# Patient Record
Sex: FEMALE | Race: WHITE | NOT HISPANIC OR LATINO | Employment: OTHER
[De-identification: names, ages, dates, MRNs, and addresses within clinical notes are randomized per-mention and may not be internally consistent; named-entity substitution may affect disease eponyms.]

---

## 2019-11-03 ENCOUNTER — HEALTH MAINTENANCE LETTER (OUTPATIENT)
Age: 64
End: 2019-11-03

## 2020-02-10 ENCOUNTER — HEALTH MAINTENANCE LETTER (OUTPATIENT)
Age: 65
End: 2020-02-10

## 2020-11-16 ENCOUNTER — HEALTH MAINTENANCE LETTER (OUTPATIENT)
Age: 65
End: 2020-11-16

## 2021-04-04 ENCOUNTER — HEALTH MAINTENANCE LETTER (OUTPATIENT)
Age: 66
End: 2021-04-04

## 2021-09-18 ENCOUNTER — HEALTH MAINTENANCE LETTER (OUTPATIENT)
Age: 66
End: 2021-09-18

## 2021-11-13 ENCOUNTER — HEALTH MAINTENANCE LETTER (OUTPATIENT)
Age: 66
End: 2021-11-13

## 2022-01-08 ENCOUNTER — HEALTH MAINTENANCE LETTER (OUTPATIENT)
Age: 67
End: 2022-01-08

## 2022-04-30 ENCOUNTER — HEALTH MAINTENANCE LETTER (OUTPATIENT)
Age: 67
End: 2022-04-30

## 2022-10-17 LAB
ALT SERPL-CCNC: 25 IU/L (ref 8–45)
AST SERPL-CCNC: 18 IU/L (ref 2–40)
CREATININE (EXTERNAL): 0.76 MG/DL (ref 0.57–1.11)
GFR ESTIMATED (EXTERNAL): 85 ML/MIN/1.73M2
GLUCOSE (EXTERNAL): 109 MG/DL (ref 65–100)
POTASSIUM (EXTERNAL): 4 MMOL/L (ref 3.5–5)

## 2022-10-27 ENCOUNTER — TRANSFERRED RECORDS (OUTPATIENT)
Dept: HEALTH INFORMATION MANAGEMENT | Facility: CLINIC | Age: 67
End: 2022-10-27

## 2022-11-03 ENCOUNTER — TRANSFERRED RECORDS (OUTPATIENT)
Dept: HEALTH INFORMATION MANAGEMENT | Facility: CLINIC | Age: 67
End: 2022-11-03

## 2022-11-03 LAB
CREATININE (EXTERNAL): 0.75 MG/DL (ref 0.57–1.11)
GFR ESTIMATED (EXTERNAL): 87 ML/MIN/1.73M2
GLUCOSE (EXTERNAL): 130 MG/DL (ref 65–100)
HBA1C MFR BLD: 6.2 %
POTASSIUM (EXTERNAL): 4.1 MMOL/L (ref 3.5–5)

## 2022-11-09 RX ORDER — LORAZEPAM 0.5 MG/1
0.5 TABLET ORAL EVERY 6 HOURS PRN
Status: ON HOLD | COMMUNITY
End: 2022-11-15

## 2022-11-09 RX ORDER — LEVOTHYROXINE SODIUM 88 UG/1
88 TABLET ORAL DAILY
COMMUNITY

## 2022-11-09 RX ORDER — METOPROLOL SUCCINATE 50 MG/1
50 TABLET, EXTENDED RELEASE ORAL AT BEDTIME
COMMUNITY

## 2022-11-09 RX ORDER — HYDROCODONE BITARTRATE AND ACETAMINOPHEN 5; 325 MG/1; MG/1
.5-1 TABLET ORAL EVERY 4 HOURS PRN
Status: ON HOLD | COMMUNITY
End: 2022-11-15

## 2022-11-09 RX ORDER — LATANOPROST 50 UG/ML
1 SOLUTION/ DROPS OPHTHALMIC AT BEDTIME
COMMUNITY

## 2022-11-11 ENCOUNTER — ANESTHESIA (OUTPATIENT)
Dept: SURGERY | Facility: CLINIC | Age: 67
DRG: 472 | End: 2022-11-11
Payer: MEDICARE

## 2022-11-11 ENCOUNTER — ANESTHESIA EVENT (OUTPATIENT)
Dept: SURGERY | Facility: CLINIC | Age: 67
DRG: 472 | End: 2022-11-11
Payer: MEDICARE

## 2022-11-11 ENCOUNTER — APPOINTMENT (OUTPATIENT)
Dept: RADIOLOGY | Facility: CLINIC | Age: 67
DRG: 472 | End: 2022-11-11
Attending: ORTHOPAEDIC SURGERY
Payer: MEDICARE

## 2022-11-11 ENCOUNTER — APPOINTMENT (OUTPATIENT)
Dept: CT IMAGING | Facility: CLINIC | Age: 67
DRG: 472 | End: 2022-11-11
Attending: PHYSICIAN ASSISTANT
Payer: MEDICARE

## 2022-11-11 ENCOUNTER — HOSPITAL ENCOUNTER (INPATIENT)
Facility: CLINIC | Age: 67
LOS: 4 days | Discharge: NURSING, REHAB OR LONG-TERM CARE | DRG: 472 | End: 2022-11-15
Attending: ORTHOPAEDIC SURGERY | Admitting: ORTHOPAEDIC SURGERY
Payer: MEDICARE

## 2022-11-11 DIAGNOSIS — L29.9 ITCHING: Primary | ICD-10-CM

## 2022-11-11 DIAGNOSIS — F41.9 ANXIETY: ICD-10-CM

## 2022-11-11 DIAGNOSIS — F41.0 PANIC ATTACK: ICD-10-CM

## 2022-11-11 PROBLEM — G95.9 CERVICAL MYELOPATHY (H): Status: ACTIVE | Noted: 2022-11-11

## 2022-11-11 PROBLEM — I48.92 ATRIAL FLUTTER (H): Status: ACTIVE | Noted: 2022-11-11

## 2022-11-11 LAB
GLUCOSE BLDC GLUCOMTR-MCNC: 118 MG/DL (ref 70–99)
GLUCOSE BLDC GLUCOMTR-MCNC: 149 MG/DL (ref 70–99)
GLUCOSE BLDC GLUCOMTR-MCNC: 167 MG/DL (ref 70–99)

## 2022-11-11 PROCEDURE — 258N000003 HC RX IP 258 OP 636: Performed by: NURSE ANESTHETIST, CERTIFIED REGISTERED

## 2022-11-11 PROCEDURE — 258N000003 HC RX IP 258 OP 636: Performed by: ORTHOPAEDIC SURGERY

## 2022-11-11 PROCEDURE — 360N000085 HC SURGERY LEVEL 5 W/ FLUORO, PER MIN: Performed by: ORTHOPAEDIC SURGERY

## 2022-11-11 PROCEDURE — 4A1034Z MONITORING OF CENTRAL NERVOUS ELECTRICAL ACTIVITY, PERCUTANEOUS APPROACH: ICD-10-PCS | Performed by: ORTHOPAEDIC SURGERY

## 2022-11-11 PROCEDURE — 258N000003 HC RX IP 258 OP 636: Performed by: ANESTHESIOLOGY

## 2022-11-11 PROCEDURE — 250N000013 HC RX MED GY IP 250 OP 250 PS 637: Performed by: ORTHOPAEDIC SURGERY

## 2022-11-11 PROCEDURE — 999N000141 HC STATISTIC PRE-PROCEDURE NURSING ASSESSMENT: Performed by: ORTHOPAEDIC SURGERY

## 2022-11-11 PROCEDURE — 00NW0ZZ RELEASE CERVICAL SPINAL CORD, OPEN APPROACH: ICD-10-PCS | Performed by: ORTHOPAEDIC SURGERY

## 2022-11-11 PROCEDURE — 0HQ0XZZ REPAIR SCALP SKIN, EXTERNAL APPROACH: ICD-10-PCS | Performed by: ORTHOPAEDIC SURGERY

## 2022-11-11 PROCEDURE — 8E0WXBF COMPUTER ASSISTED PROCEDURE OF TRUNK REGION, WITH FLUOROSCOPY: ICD-10-PCS | Performed by: ORTHOPAEDIC SURGERY

## 2022-11-11 PROCEDURE — 250N000009 HC RX 250: Performed by: ORTHOPAEDIC SURGERY

## 2022-11-11 PROCEDURE — 250N000009 HC RX 250: Performed by: NURSE ANESTHETIST, CERTIFIED REGISTERED

## 2022-11-11 PROCEDURE — 250N000011 HC RX IP 250 OP 636: Performed by: NURSE ANESTHETIST, CERTIFIED REGISTERED

## 2022-11-11 PROCEDURE — 250N000011 HC RX IP 250 OP 636: Performed by: PHYSICIAN ASSISTANT

## 2022-11-11 PROCEDURE — 710N000010 HC RECOVERY PHASE 1, LEVEL 2, PER MIN: Performed by: ORTHOPAEDIC SURGERY

## 2022-11-11 PROCEDURE — 4A1134G MONITORING OF PERIPHERAL NERVOUS ELECTRICAL ACTIVITY, INTRAOPERATIVE, PERCUTANEOUS APPROACH: ICD-10-PCS | Performed by: ORTHOPAEDIC SURGERY

## 2022-11-11 PROCEDURE — 250N000011 HC RX IP 250 OP 636: Performed by: ANESTHESIOLOGY

## 2022-11-11 PROCEDURE — 370N000017 HC ANESTHESIA TECHNICAL FEE, PER MIN: Performed by: ORTHOPAEDIC SURGERY

## 2022-11-11 PROCEDURE — C1762 CONN TISS, HUMAN(INC FASCIA): HCPCS | Performed by: ORTHOPAEDIC SURGERY

## 2022-11-11 PROCEDURE — 01N10ZZ RELEASE CERVICAL NERVE, OPEN APPROACH: ICD-10-PCS | Performed by: ORTHOPAEDIC SURGERY

## 2022-11-11 PROCEDURE — 0RG4071 FUSION OF CERVICOTHORACIC VERTEBRAL JOINT WITH AUTOLOGOUS TISSUE SUBSTITUTE, POSTERIOR APPROACH, POSTERIOR COLUMN, OPEN APPROACH: ICD-10-PCS | Performed by: ORTHOPAEDIC SURGERY

## 2022-11-11 PROCEDURE — C1713 ANCHOR/SCREW BN/BN,TIS/BN: HCPCS | Performed by: ORTHOPAEDIC SURGERY

## 2022-11-11 PROCEDURE — 272N000001 HC OR GENERAL SUPPLY STERILE: Performed by: ORTHOPAEDIC SURGERY

## 2022-11-11 PROCEDURE — 250N000011 HC RX IP 250 OP 636: Performed by: ORTHOPAEDIC SURGERY

## 2022-11-11 PROCEDURE — G1010 CDSM STANSON: HCPCS

## 2022-11-11 PROCEDURE — 250N000005 HC OR RX SURGIFLO HEMOSTATIC MATRIX 10ML 199102S OPNP: Performed by: ORTHOPAEDIC SURGERY

## 2022-11-11 PROCEDURE — 120N000001 HC R&B MED SURG/OB

## 2022-11-11 PROCEDURE — 250N000011 HC RX IP 250 OP 636: Performed by: HOSPITALIST

## 2022-11-11 PROCEDURE — 999N000182 XR SURGERY CARM FLUORO GREATER THAN 5 MIN: Mod: TC

## 2022-11-11 PROCEDURE — 250N000025 HC SEVOFLURANE, PER MIN: Performed by: ORTHOPAEDIC SURGERY

## 2022-11-11 DEVICE — SCREW BN 22MM 4MM MA NS INFNT SPNE LF: Type: IMPLANTABLE DEVICE | Site: SPINE CERVICAL | Status: FUNCTIONAL

## 2022-11-11 DEVICE — ROD SPNL 30MM 3.5MM PCUT: Type: IMPLANTABLE DEVICE | Site: SPINE CERVICAL | Status: FUNCTIONAL

## 2022-11-11 DEVICE — IMP SCREW INFINITY SPINE MULTIAXIAL 16MMX3.5MM 3603516: Type: IMPLANTABLE DEVICE | Site: SPINE CERVICAL | Status: FUNCTIONAL

## 2022-11-11 DEVICE — IMP SET SCREW MEDTRONIC INFINITY 3600215: Type: IMPLANTABLE DEVICE | Site: SPINE CERVICAL | Status: FUNCTIONAL

## 2022-11-11 DEVICE — SCREW BN 20MM 3.5MM MA NS INFNT SPNE OC UPR THOR LF: Type: IMPLANTABLE DEVICE | Site: SPINE CERVICAL | Status: FUNCTIONAL

## 2022-11-11 DEVICE — KIT BNGF 6CC DMNR CORT FBR ACCELERATE GRFTN CNN T50206: Type: IMPLANTABLE DEVICE | Site: SPINE CERVICAL | Status: FUNCTIONAL

## 2022-11-11 RX ORDER — GABAPENTIN 100 MG/1
100 CAPSULE ORAL
Status: DISCONTINUED | OUTPATIENT
Start: 2022-11-11 | End: 2022-11-11 | Stop reason: CLARIF

## 2022-11-11 RX ORDER — CLOTRIMAZOLE AND BETAMETHASONE DIPROPIONATE 10; .64 MG/G; MG/G
CREAM TOPICAL 2 TIMES DAILY PRN
COMMUNITY

## 2022-11-11 RX ORDER — LISINOPRIL 40 MG/1
40 TABLET ORAL AT BEDTIME
COMMUNITY

## 2022-11-11 RX ORDER — CEFAZOLIN SODIUM/WATER 2 G/20 ML
2 SYRINGE (ML) INTRAVENOUS SEE ADMIN INSTRUCTIONS
Status: DISCONTINUED | OUTPATIENT
Start: 2022-11-11 | End: 2022-11-11 | Stop reason: HOSPADM

## 2022-11-11 RX ORDER — CLONIDINE HYDROCHLORIDE 0.1 MG/1
0.1 TABLET ORAL DAILY PRN
COMMUNITY

## 2022-11-11 RX ORDER — POLYETHYLENE GLYCOL 3350 17 G/17G
17 POWDER, FOR SOLUTION ORAL DAILY
Status: DISCONTINUED | OUTPATIENT
Start: 2022-11-12 | End: 2022-11-15 | Stop reason: HOSPADM

## 2022-11-11 RX ORDER — LORAZEPAM 0.5 MG/1
0.5 TABLET ORAL EVERY 6 HOURS PRN
Status: DISCONTINUED | OUTPATIENT
Start: 2022-11-11 | End: 2022-11-15 | Stop reason: HOSPADM

## 2022-11-11 RX ORDER — SODIUM CHLORIDE, SODIUM LACTATE, POTASSIUM CHLORIDE, CALCIUM CHLORIDE 600; 310; 30; 20 MG/100ML; MG/100ML; MG/100ML; MG/100ML
INJECTION, SOLUTION INTRAVENOUS CONTINUOUS
Status: DISCONTINUED | OUTPATIENT
Start: 2022-11-11 | End: 2022-11-11

## 2022-11-11 RX ORDER — CLOTRIMAZOLE AND BETAMETHASONE DIPROPIONATE 10; .64 MG/G; MG/G
CREAM TOPICAL 2 TIMES DAILY PRN
Status: DISCONTINUED | OUTPATIENT
Start: 2022-11-11 | End: 2022-11-15 | Stop reason: HOSPADM

## 2022-11-11 RX ORDER — BUPIVACAINE HYDROCHLORIDE AND EPINEPHRINE 2.5; 5 MG/ML; UG/ML
2 INJECTION, SOLUTION EPIDURAL; INFILTRATION; INTRACAUDAL; PERINEURAL ONCE
Status: DISCONTINUED | OUTPATIENT
Start: 2022-11-11 | End: 2022-11-11 | Stop reason: HOSPADM

## 2022-11-11 RX ORDER — ACETAMINOPHEN 325 MG/1
650 TABLET ORAL EVERY 4 HOURS PRN
Status: DISCONTINUED | OUTPATIENT
Start: 2022-11-14 | End: 2022-11-15 | Stop reason: HOSPADM

## 2022-11-11 RX ORDER — AMOXICILLIN 250 MG
1 CAPSULE ORAL 2 TIMES DAILY
Status: DISCONTINUED | OUTPATIENT
Start: 2022-11-11 | End: 2022-11-15 | Stop reason: HOSPADM

## 2022-11-11 RX ORDER — PROPOFOL 10 MG/ML
INJECTION, EMULSION INTRAVENOUS PRN
Status: DISCONTINUED | OUTPATIENT
Start: 2022-11-11 | End: 2022-11-11

## 2022-11-11 RX ORDER — PROPOFOL 10 MG/ML
INJECTION, EMULSION INTRAVENOUS CONTINUOUS PRN
Status: DISCONTINUED | OUTPATIENT
Start: 2022-11-11 | End: 2022-11-11

## 2022-11-11 RX ORDER — GLYCOPYRROLATE 0.2 MG/ML
INJECTION, SOLUTION INTRAMUSCULAR; INTRAVENOUS PRN
Status: DISCONTINUED | OUTPATIENT
Start: 2022-11-11 | End: 2022-11-11

## 2022-11-11 RX ORDER — ONDANSETRON 2 MG/ML
4 INJECTION INTRAMUSCULAR; INTRAVENOUS EVERY 6 HOURS PRN
Status: DISCONTINUED | OUTPATIENT
Start: 2022-11-11 | End: 2022-11-15 | Stop reason: HOSPADM

## 2022-11-11 RX ORDER — OXYCODONE HYDROCHLORIDE 5 MG/1
5 TABLET ORAL EVERY 4 HOURS PRN
Status: DISCONTINUED | OUTPATIENT
Start: 2022-11-11 | End: 2022-11-11 | Stop reason: HOSPADM

## 2022-11-11 RX ORDER — LEVOTHYROXINE SODIUM 88 UG/1
88 TABLET ORAL DAILY
Status: DISCONTINUED | OUTPATIENT
Start: 2022-11-12 | End: 2022-11-15 | Stop reason: HOSPADM

## 2022-11-11 RX ORDER — MAGNESIUM SULFATE 4 G/50ML
4 INJECTION INTRAVENOUS ONCE
Status: COMPLETED | OUTPATIENT
Start: 2022-11-11 | End: 2022-11-11

## 2022-11-11 RX ORDER — FENTANYL CITRATE 50 UG/ML
INJECTION, SOLUTION INTRAMUSCULAR; INTRAVENOUS PRN
Status: DISCONTINUED | OUTPATIENT
Start: 2022-11-11 | End: 2022-11-11

## 2022-11-11 RX ORDER — ONDANSETRON 4 MG/1
4 TABLET, ORALLY DISINTEGRATING ORAL EVERY 6 HOURS PRN
Status: DISCONTINUED | OUTPATIENT
Start: 2022-11-11 | End: 2022-11-15 | Stop reason: HOSPADM

## 2022-11-11 RX ORDER — LORATADINE 10 MG/1
10 TABLET ORAL DAILY PRN
Status: DISCONTINUED | OUTPATIENT
Start: 2022-11-11 | End: 2022-11-15 | Stop reason: HOSPADM

## 2022-11-11 RX ORDER — CEFAZOLIN SODIUM/WATER 2 G/20 ML
2 SYRINGE (ML) INTRAVENOUS
Status: COMPLETED | OUTPATIENT
Start: 2022-11-11 | End: 2022-11-11

## 2022-11-11 RX ORDER — ONDANSETRON 2 MG/ML
4 INJECTION INTRAMUSCULAR; INTRAVENOUS EVERY 30 MIN PRN
Status: DISCONTINUED | OUTPATIENT
Start: 2022-11-11 | End: 2022-11-11 | Stop reason: HOSPADM

## 2022-11-11 RX ORDER — ACETAMINOPHEN 500 MG
1000 TABLET ORAL 4 TIMES DAILY
COMMUNITY

## 2022-11-11 RX ORDER — CEFAZOLIN SODIUM 2 G/100ML
2 INJECTION, SOLUTION INTRAVENOUS EVERY 8 HOURS
Status: COMPLETED | OUTPATIENT
Start: 2022-11-11 | End: 2022-11-12

## 2022-11-11 RX ORDER — BISACODYL 10 MG
10 SUPPOSITORY, RECTAL RECTAL DAILY PRN
Status: DISCONTINUED | OUTPATIENT
Start: 2022-11-11 | End: 2022-11-15 | Stop reason: HOSPADM

## 2022-11-11 RX ORDER — HYDROMORPHONE HCL IN WATER/PF 6 MG/30 ML
0.4 PATIENT CONTROLLED ANALGESIA SYRINGE INTRAVENOUS
Status: DISCONTINUED | OUTPATIENT
Start: 2022-11-11 | End: 2022-11-12

## 2022-11-11 RX ORDER — SODIUM CHLORIDE 9 MG/ML
INJECTION, SOLUTION INTRAVENOUS CONTINUOUS PRN
Status: DISCONTINUED | OUTPATIENT
Start: 2022-11-11 | End: 2022-11-11

## 2022-11-11 RX ORDER — HYDROMORPHONE HCL IN WATER/PF 6 MG/30 ML
0.2 PATIENT CONTROLLED ANALGESIA SYRINGE INTRAVENOUS
Status: DISCONTINUED | OUTPATIENT
Start: 2022-11-11 | End: 2022-11-12

## 2022-11-11 RX ORDER — CARBOXYMETHYLCELLULOSE SODIUM 10 MG/ML
1 GEL OPHTHALMIC 4 TIMES DAILY PRN
Status: DISCONTINUED | OUTPATIENT
Start: 2022-11-11 | End: 2022-11-15 | Stop reason: HOSPADM

## 2022-11-11 RX ORDER — LIDOCAINE 40 MG/G
CREAM TOPICAL
Status: DISCONTINUED | OUTPATIENT
Start: 2022-11-11 | End: 2022-11-11 | Stop reason: CLARIF

## 2022-11-11 RX ORDER — LIDOCAINE HYDROCHLORIDE 10 MG/ML
INJECTION, SOLUTION INFILTRATION; PERINEURAL PRN
Status: DISCONTINUED | OUTPATIENT
Start: 2022-11-11 | End: 2022-11-11

## 2022-11-11 RX ORDER — DEXAMETHASONE SODIUM PHOSPHATE 10 MG/ML
INJECTION, SOLUTION INTRAMUSCULAR; INTRAVENOUS PRN
Status: DISCONTINUED | OUTPATIENT
Start: 2022-11-11 | End: 2022-11-11

## 2022-11-11 RX ORDER — DEXMEDETOMIDINE HYDROCHLORIDE 4 UG/ML
INJECTION, SOLUTION INTRAVENOUS CONTINUOUS PRN
Status: DISCONTINUED | OUTPATIENT
Start: 2022-11-11 | End: 2022-11-11

## 2022-11-11 RX ORDER — AMOXICILLIN 250 MG
1 CAPSULE ORAL 2 TIMES DAILY
COMMUNITY

## 2022-11-11 RX ORDER — PROCHLORPERAZINE MALEATE 5 MG
5 TABLET ORAL EVERY 6 HOURS PRN
Status: DISCONTINUED | OUTPATIENT
Start: 2022-11-11 | End: 2022-11-15 | Stop reason: HOSPADM

## 2022-11-11 RX ORDER — LATANOPROST 50 UG/ML
1 SOLUTION/ DROPS OPHTHALMIC AT BEDTIME
Status: DISCONTINUED | OUTPATIENT
Start: 2022-11-11 | End: 2022-11-15 | Stop reason: HOSPADM

## 2022-11-11 RX ORDER — MULTIVITAMIN,THERAPEUTIC
1 TABLET ORAL DAILY
Status: DISCONTINUED | OUTPATIENT
Start: 2022-11-12 | End: 2022-11-15 | Stop reason: HOSPADM

## 2022-11-11 RX ORDER — ONDANSETRON 4 MG/1
4 TABLET, ORALLY DISINTEGRATING ORAL EVERY 30 MIN PRN
Status: DISCONTINUED | OUTPATIENT
Start: 2022-11-11 | End: 2022-11-11 | Stop reason: HOSPADM

## 2022-11-11 RX ORDER — VANCOMYCIN HYDROCHLORIDE 1 G/20ML
1 INJECTION, POWDER, LYOPHILIZED, FOR SOLUTION INTRAVENOUS
Status: DISCONTINUED | OUTPATIENT
Start: 2022-11-11 | End: 2022-11-11 | Stop reason: CLARIF

## 2022-11-11 RX ORDER — OXYCODONE HYDROCHLORIDE 5 MG/1
10 TABLET ORAL EVERY 4 HOURS PRN
Status: DISCONTINUED | OUTPATIENT
Start: 2022-11-11 | End: 2022-11-12

## 2022-11-11 RX ORDER — LISINOPRIL 40 MG/1
40 TABLET ORAL AT BEDTIME
Status: DISCONTINUED | OUTPATIENT
Start: 2022-11-11 | End: 2022-11-15 | Stop reason: HOSPADM

## 2022-11-11 RX ORDER — PANTOPRAZOLE SODIUM 20 MG/1
40 TABLET, DELAYED RELEASE ORAL DAILY
Status: DISCONTINUED | OUTPATIENT
Start: 2022-11-11 | End: 2022-11-15 | Stop reason: HOSPADM

## 2022-11-11 RX ORDER — OXYCODONE HYDROCHLORIDE 5 MG/1
5 TABLET ORAL EVERY 4 HOURS PRN
Status: DISCONTINUED | OUTPATIENT
Start: 2022-11-11 | End: 2022-11-12

## 2022-11-11 RX ORDER — HYDRALAZINE HYDROCHLORIDE 20 MG/ML
10 INJECTION INTRAMUSCULAR; INTRAVENOUS EVERY 6 HOURS PRN
Status: DISCONTINUED | OUTPATIENT
Start: 2022-11-11 | End: 2022-11-12

## 2022-11-11 RX ORDER — CARBOXYMETHYLCELLULOSE SODIUM 10 MG/ML
1 GEL OPHTHALMIC 4 TIMES DAILY PRN
COMMUNITY

## 2022-11-11 RX ORDER — HYDROMORPHONE HCL IN WATER/PF 6 MG/30 ML
0.2 PATIENT CONTROLLED ANALGESIA SYRINGE INTRAVENOUS EVERY 5 MIN PRN
Status: DISCONTINUED | OUTPATIENT
Start: 2022-11-11 | End: 2022-11-11 | Stop reason: HOSPADM

## 2022-11-11 RX ORDER — BUPIVACAINE HYDROCHLORIDE AND EPINEPHRINE 2.5; 5 MG/ML; UG/ML
INJECTION, SOLUTION EPIDURAL; INFILTRATION; INTRACAUDAL; PERINEURAL PRN
Status: DISCONTINUED | OUTPATIENT
Start: 2022-11-11 | End: 2022-11-11

## 2022-11-11 RX ORDER — ACETAMINOPHEN 325 MG/1
975 TABLET ORAL EVERY 8 HOURS
Status: COMPLETED | OUTPATIENT
Start: 2022-11-11 | End: 2022-11-14

## 2022-11-11 RX ORDER — NICOTINE POLACRILEX 4 MG/1
20 GUM, CHEWING ORAL DAILY
COMMUNITY

## 2022-11-11 RX ORDER — METOPROLOL SUCCINATE 50 MG/1
50 TABLET, EXTENDED RELEASE ORAL AT BEDTIME
Status: DISCONTINUED | OUTPATIENT
Start: 2022-11-11 | End: 2022-11-15 | Stop reason: HOSPADM

## 2022-11-11 RX ORDER — SODIUM CHLORIDE 9 MG/ML
INJECTION, SOLUTION INTRAVENOUS CONTINUOUS
Status: DISCONTINUED | OUTPATIENT
Start: 2022-11-11 | End: 2022-11-12

## 2022-11-11 RX ORDER — FENTANYL CITRATE 50 UG/ML
25 INJECTION, SOLUTION INTRAMUSCULAR; INTRAVENOUS EVERY 5 MIN PRN
Status: DISCONTINUED | OUTPATIENT
Start: 2022-11-11 | End: 2022-11-11 | Stop reason: HOSPADM

## 2022-11-11 RX ADMIN — HYDROMORPHONE HYDROCHLORIDE 0.5 MG: 1 INJECTION, SOLUTION INTRAMUSCULAR; INTRAVENOUS; SUBCUTANEOUS at 13:49

## 2022-11-11 RX ADMIN — SODIUM CHLORIDE, POTASSIUM CHLORIDE, SODIUM LACTATE AND CALCIUM CHLORIDE: 600; 310; 30; 20 INJECTION, SOLUTION INTRAVENOUS at 16:44

## 2022-11-11 RX ADMIN — Medication 0.7 MCG/KG/HR: at 12:45

## 2022-11-11 RX ADMIN — FENTANYL CITRATE 25 MCG: 50 INJECTION, SOLUTION INTRAMUSCULAR; INTRAVENOUS at 17:43

## 2022-11-11 RX ADMIN — PROPOFOL 50 MG: 10 INJECTION, EMULSION INTRAVENOUS at 12:50

## 2022-11-11 RX ADMIN — HYDROMORPHONE HYDROCHLORIDE 0.4 MG: 0.2 INJECTION, SOLUTION INTRAMUSCULAR; INTRAVENOUS; SUBCUTANEOUS at 21:08

## 2022-11-11 RX ADMIN — PHENYLEPHRINE HYDROCHLORIDE 0.2 MCG/KG/MIN: 10 INJECTION INTRAVENOUS at 13:00

## 2022-11-11 RX ADMIN — LIDOCAINE HYDROCHLORIDE 20 MG: 10 INJECTION, SOLUTION INFILTRATION; PERINEURAL at 12:45

## 2022-11-11 RX ADMIN — HYDROMORPHONE HYDROCHLORIDE 0.2 MG: 0.2 INJECTION, SOLUTION INTRAMUSCULAR; INTRAVENOUS; SUBCUTANEOUS at 19:05

## 2022-11-11 RX ADMIN — PROPOFOL 150 MG: 10 INJECTION, EMULSION INTRAVENOUS at 12:45

## 2022-11-11 RX ADMIN — METOPROLOL SUCCINATE 50 MG: 50 TABLET, EXTENDED RELEASE ORAL at 21:08

## 2022-11-11 RX ADMIN — LISINOPRIL 40 MG: 40 TABLET ORAL at 21:01

## 2022-11-11 RX ADMIN — OXYCODONE HYDROCHLORIDE 10 MG: 5 TABLET ORAL at 22:10

## 2022-11-11 RX ADMIN — PROPOFOL 50 MG: 10 INJECTION, EMULSION INTRAVENOUS at 16:50

## 2022-11-11 RX ADMIN — ACETAMINOPHEN 975 MG: 325 TABLET, FILM COATED ORAL at 21:00

## 2022-11-11 RX ADMIN — CEFAZOLIN SODIUM 2 G: 2 INJECTION, SOLUTION INTRAVENOUS at 21:14

## 2022-11-11 RX ADMIN — FENTANYL CITRATE 50 MCG: 50 INJECTION, SOLUTION INTRAMUSCULAR; INTRAVENOUS at 12:40

## 2022-11-11 RX ADMIN — PHENYLEPHRINE HYDROCHLORIDE 100 MCG: 10 INJECTION INTRAVENOUS at 13:40

## 2022-11-11 RX ADMIN — PHENYLEPHRINE HYDROCHLORIDE 100 MCG: 10 INJECTION INTRAVENOUS at 13:00

## 2022-11-11 RX ADMIN — DEXAMETHASONE SODIUM PHOSPHATE 10 MG: 10 INJECTION, SOLUTION INTRAMUSCULAR; INTRAVENOUS at 12:45

## 2022-11-11 RX ADMIN — MAGNESIUM SULFATE HEPTAHYDRATE 4 G: 4 INJECTION, SOLUTION INTRAVENOUS at 11:48

## 2022-11-11 RX ADMIN — LATANOPROST 1 DROP: 50 SOLUTION/ DROPS OPHTHALMIC at 22:01

## 2022-11-11 RX ADMIN — Medication 2 G: at 12:55

## 2022-11-11 RX ADMIN — SODIUM CHLORIDE, POTASSIUM CHLORIDE, SODIUM LACTATE AND CALCIUM CHLORIDE: 600; 310; 30; 20 INJECTION, SOLUTION INTRAVENOUS at 12:35

## 2022-11-11 RX ADMIN — Medication 100 MG: at 12:45

## 2022-11-11 RX ADMIN — HYDROMORPHONE HYDROCHLORIDE 0.5 MG: 1 INJECTION, SOLUTION INTRAMUSCULAR; INTRAVENOUS; SUBCUTANEOUS at 16:20

## 2022-11-11 RX ADMIN — PROPOFOL 200 MCG/KG/MIN: 10 INJECTION, EMULSION INTRAVENOUS at 12:50

## 2022-11-11 RX ADMIN — HYDRALAZINE HYDROCHLORIDE 10 MG: 20 INJECTION, SOLUTION INTRAMUSCULAR; INTRAVENOUS at 22:17

## 2022-11-11 RX ADMIN — FENTANYL CITRATE 25 MCG: 50 INJECTION, SOLUTION INTRAMUSCULAR; INTRAVENOUS at 17:38

## 2022-11-11 RX ADMIN — FENTANYL CITRATE 25 MCG: 50 INJECTION, SOLUTION INTRAMUSCULAR; INTRAVENOUS at 17:59

## 2022-11-11 RX ADMIN — SODIUM CHLORIDE: 9 INJECTION, SOLUTION INTRAVENOUS at 21:05

## 2022-11-11 RX ADMIN — SODIUM CHLORIDE, POTASSIUM CHLORIDE, SODIUM LACTATE AND CALCIUM CHLORIDE: 600; 310; 30; 20 INJECTION, SOLUTION INTRAVENOUS at 14:20

## 2022-11-11 RX ADMIN — SODIUM CHLORIDE: 0.9 INJECTION, SOLUTION INTRAVENOUS at 12:50

## 2022-11-11 RX ADMIN — FENTANYL CITRATE 50 MCG: 50 INJECTION, SOLUTION INTRAMUSCULAR; INTRAVENOUS at 12:45

## 2022-11-11 RX ADMIN — PHENYLEPHRINE HYDROCHLORIDE 100 MCG: 10 INJECTION INTRAVENOUS at 16:30

## 2022-11-11 RX ADMIN — SENNOSIDES AND DOCUSATE SODIUM 1 TABLET: 50; 8.6 TABLET ORAL at 21:01

## 2022-11-11 RX ADMIN — PHENYLEPHRINE HYDROCHLORIDE 100 MCG: 10 INJECTION INTRAVENOUS at 13:56

## 2022-11-11 RX ADMIN — HYDROMORPHONE HYDROCHLORIDE 0.5 MG: 1 INJECTION, SOLUTION INTRAMUSCULAR; INTRAVENOUS; SUBCUTANEOUS at 15:10

## 2022-11-11 RX ADMIN — GLYCOPYRROLATE 0.2 MG: 0.2 INJECTION, SOLUTION INTRAMUSCULAR; INTRAVENOUS at 13:10

## 2022-11-11 RX ADMIN — HYDROMORPHONE HYDROCHLORIDE 0.5 MG: 1 INJECTION, SOLUTION INTRAMUSCULAR; INTRAVENOUS; SUBCUTANEOUS at 14:35

## 2022-11-11 ASSESSMENT — ACTIVITIES OF DAILY LIVING (ADL)
ADLS_ACUITY_SCORE: 35

## 2022-11-11 NOTE — ANESTHESIA PREPROCEDURE EVALUATION
Anesthesia Pre-Procedure Evaluation    Patient: Mary Jo Vallejo   MRN: [id number] : 1955        Procedure : Procedure(s):  CERVICAL 7 - THORACIC 1 LAMINECTOMY AND FUSION, LEFT CERVICAL 5 - CERVICAL 6 LAMINOFORAMINOTOMY WITH STEALTH NAVIGATION          Past Medical History:   Diagnosis Date     Acute posthemorrhagic anemia      Body mass index 40.0-44.9, adult (H)      Chronic atrial fibrillation (H)      Diaphragmatic hernia without mention of obstruction or gangrene     Hiatal hernia     DM (diabetes mellitus) (H) 2011    hospitalized     DM (diabetes mellitus) (H)     oral control     Fibromyalgia      Hyperlipidemia LDL goal <100 2011    diet controlled     Hypertension      Hypothyroidism 2011     Intermittent asthma 2009    Related to bronchitis     Irregular heart beat      Irritable bowel syndrome     Irritable bowel     Other chronic pain      Primary localized osteoarthrosis, lower leg 10/27/2013    Hospitalized     Reflux esophagitis      Sleep apnea      Thyroglossal cyst     query     Unspecified hypothyroidism     Hypothyroidism      Past Surgical History:   Procedure Laterality Date     HC DRAIN/INJ MAJOR JOINT/BURSA W/O US  10/24/13    RT     LAPAROSCOPIC CHOLECYSTOENTEROSTOMY      Cholecystectomy, Laparoscopic     ZZC APPENDECTOMY       Z NONSPECIFIC PROCEDURE  12    @ [place], histiocytic giant cell inflammation, interior border of (L) hyoid bone, cervical exploration, excision of central hyoid bone and adjacent strap musculature     ZZ TOTAL KNEE ARTHROPLASTY  10/24/13    LT     ZZC VAG HYST,RMV TUBE/OVARY      both ovaries gone- non cancerous reason      Allergies   Allergen Reactions     Valium [Diazepam] Anaphylaxis     Gets very obstenant     Erythromycin Nausea and Vomiting     Augmentin Nausea and Vomiting     Noted in 09 ER     Bee Venom Swelling     Food      Multiple food allergies, ask patient     Penicillins Itching     swelling and rash      Simvastatin Muscle Pain (Myalgia)     Adhesive Tape Rash     silicvone      Social History     Tobacco Use     Smoking status: Never     Smokeless tobacco: Never     Tobacco comments:     no second hand smoke at home or work   Substance Use Topics     Alcohol use: Not Currently     Comment: rare- 1 drink every 3 months or so      Wt Readings from Last 1 Encounters:   11/11/22 111.5 kg (245 lb 14.4 oz)        Anesthesia Evaluation            ROS/MED HX  ENT/Pulmonary:     (+) sleep apnea, asthma     Neurologic: Comment: Cervical myelopathy      Cardiovascular:     (+) hypertension-----Irregular Heartbeat/Palpitations,     METS/Exercise Tolerance:     Hematologic:       Musculoskeletal:       GI/Hepatic:  - neg GI/hepatic ROS     Renal/Genitourinary:  - neg Renal ROS     Endo:     (+) type II DM, thyroid problem, hypothyroidism, Obesity,     Psychiatric/Substance Use:       Infectious Disease:       Malignancy:       Other:      (+) , H/O Chronic Pain,        Physical Exam    Airway  airway exam normal           Respiratory Devices and Support         Dental  no notable dental history         Cardiovascular   cardiovascular exam normal          Pulmonary   pulmonary exam normal                OUTSIDE LABS:  CBC:   Lab Results   Component Value Date    WBC 5.9 10/03/2013    WBC 5.6 10/01/2012    HGB 9.6 (L) 10/26/2013    HGB 9.9 (L) 10/25/2013    HCT 39.6 10/03/2013    HCT 39.9 10/01/2012     10/03/2013     10/01/2012     BMP:   Lab Results   Component Value Date     11/04/2013     (H) 10/03/2013    POTASSIUM 4.7 11/04/2013    POTASSIUM 4.2 10/03/2013    CHLORIDE 102 11/04/2013    CHLORIDE 106 10/03/2013    CO2 30 11/04/2013    CO2 27 10/03/2013    BUN 18 11/04/2013    BUN 18 10/03/2013    CR 0.95 11/04/2013    CR 0.77 10/25/2013     (H) 11/11/2022     (H) 11/04/2013     COAGS:   Lab Results   Component Value Date    PTT 24 04/10/2008    INR 0.94 06/07/2009     POC: No  results found for: BGM, HCG, HCGS  HEPATIC:   Lab Results   Component Value Date    ALBUMIN 5.1 (H) 02/25/2011    PROTTOTAL 7.9 02/25/2011    ALT 41 04/30/2013    AST 31 02/13/2012    ALKPHOS 150 02/25/2011    BILITOTAL 0.5 02/25/2011     OTHER:   Lab Results   Component Value Date    PH 7.48 (H) 02/25/2011    A1C 6.0 02/26/2014    CAIO 9.4 11/04/2013    PHOS 4.5 02/28/2011    MAG 2.1 02/28/2011    LIPASE 633 (H) 06/11/2009    AMYLASE 85 06/11/2009    TSH 2.32 08/14/2013    T4 1.18 02/13/2012       Anesthesia Plan    ASA Status:  3   NPO Status:  NPO Appropriate    Anesthesia Type: General.     - Airway: ETT   Induction: Intravenous.      Techniques and Equipment:     - Airway: Video-Laryngoscope         Consents    Anesthesia Plan(s) and associated risks, benefits, and realistic alternatives discussed. Questions answered and patient/representative(s) expressed understanding.    - Discussed:     - Discussed with:  Patient         Postoperative Care    Pain management: IV analgesics.   PONV prophylaxis: Ondansetron (or other 5HT-3), Dexamethasone or Solumedrol     Comments:                AKBAR LAMB MD

## 2022-11-11 NOTE — INTERVAL H&P NOTE
"I have reviewed the surgical (or preoperative) H&P that is linked to this encounter, and examined the patient. There are no significant changes    Clinical Conditions Present on Arrival:  Clinically Significant Risk Factors Present on Admission                    # Severe Obesity: Estimated body mass index is 42.21 kg/m  as calculated from the following:    Height as of this encounter: 1.626 m (5' 4\").    Weight as of this encounter: 111.5 kg (245 lb 14.4 oz).       "

## 2022-11-11 NOTE — PHARMACY-ADMISSION MEDICATION HISTORY
Pharmacy Note - Admission Medication History    Pertinent Provider Information:    ______________________________________________________________________    Prior To Admission (PTA) med list completed and updated in EMR.       PTA Med List   Medication Sig Last Dose     acetaminophen (TYLENOL) 500 MG tablet Take 1,000 mg by mouth every 6 hours as needed for mild pain 11/10/2022     carboxymethylcellulose PF (REFRESH LIQUIGEL) 1 % ophthalmic gel Place 1 drop into both eyes 4 times daily as needed      cloNIDine (CATAPRES) 0.1 MG tablet Take 0.1 mg by mouth daily as needed For sbp>180      clotrimazole-betamethasone (LOTRISONE) 1-0.05 % external cream Apply topically 2 times daily as needed      HYDROcodone-acetaminophen (NORCO) 5-325 MG tablet Take 0.5-1 tablets by mouth every 4 hours as needed for severe pain 11/10/2022     latanoprost (XALATAN) 0.005 % ophthalmic solution Place 1 drop into both eyes At Bedtime 11/10/2022 at Pt brought     levothyroxine (SYNTHROID/LEVOTHROID) 88 MCG tablet Take 88 mcg by mouth daily 11/11/2022 at AM     lisinopril (ZESTRIL) 40 MG tablet Take 40 mg by mouth At Bedtime 11/10/2022 at PM     LORazepam (ATIVAN) 0.5 MG tablet Take 0.5 mg by mouth every 6 hours as needed for anxiety      metoprolol succinate ER (TOPROL XL) 50 MG 24 hr tablet Take 50 mg by mouth At Bedtime 11/10/2022 at PM     omeprazole 20 MG tablet Take 20 mg by mouth daily 11/11/2022 at AM       Information source(s): Patient and CareEverywhere/SureScripts    Method of interview communication: in-person    Patient was asked about OTC/herbal products specifically.  PTA med list reflects this.    Based on the pharmacist's assessment, the PTA med list information appears reliable    Allergies were reviewed, assessed, and updated with the patient.      Medications available for use during hospital stay: latanoprost, carboxymethylcellulose.      Thank you for the opportunity to participate in the care of this  patient.      Arleth Dong [facility]     11/11/2022     12:07 PM

## 2022-11-11 NOTE — ANESTHESIA PROCEDURE NOTES
Airway         Procedure Start/Stop Times: 11/11/2022 12:47 PM and 11/11/2022 12:50 PM  Staff -        CRNA: Min Ragland APRN CRNA       Performed By: CRNA  Consent for Airway        Urgency: elective  Indications and Patient Condition       Indications for airway management: rosalia-procedural       Induction type:intravenous       Mask difficulty assessment: 1 - vent by mask    Final Airway Details       Final airway type: endotracheal airway       Successful airway: ETT - single  Endotracheal Airway Details        ETT size (mm): 7.0       Cuffed: yes       Successful intubation technique: video laryngoscopy       VL Blade Size: Glidescope 3       Grade View of Cords: 1       Adjucts: stylet       Position: Right       Measured from: lips       Secured at (cm): 21       Bite block used: Soft    Post intubation assessment        Placement verified by: capnometry        Number of attempts at approach: 1       Secured with: silk tape       Ease of procedure: easy       Dentition: Intact and Unchanged       Dental guard used and removed. Dental Guard Type: Proguard Red.    Medication(s) Administered   Medication Administration Time: 11/11/2022 12:47 PM

## 2022-11-11 NOTE — ANESTHESIA CARE TRANSFER NOTE
Patient: Mary Jo Vallejo    Procedure: Procedure(s):  CERVICAL 7 - THORACIC 1 LAMINECTOMY AND FUSION, LEFT CERVICAL 5 - CERVICAL 6 LAMINOFORAMINOTOMY WITH STEALTH NAVIGATION       Diagnosis: Neck pain [M54.2]  Diagnosis Additional Information: No value filed.    Anesthesia Type:   General     Note:    Oropharynx: oropharynx clear of all foreign objects  Level of Consciousness: awake  Oxygen Supplementation: face mask  Level of Supplemental Oxygen (L/min / FiO2): 6  Independent Airway: airway patency satisfactory and stable    Vital Signs Stable: post-procedure vital signs reviewed and stable  Report to RN Given: handoff report given  Patient transferred to: PACU  Comments: /67  Handoff Report: Identifed the Patient, Identified the Reponsible Provider, Reviewed the pertinent medical history, Discussed the surgical course, Reviewed Intra-OP anesthesia mangement and issues during anesthesia, Set expectations for post-procedure period and Allowed opportunity for questions and acknowledgement of understanding      Vitals:  Vitals Value Taken Time   BP     Temp     Pulse 65 11/11/22 1724   Resp 13 11/11/22 1724   SpO2 98 % 11/11/22 1724   Vitals shown include unvalidated device data.    Electronically Signed By: BENNIE Kendall CRNA  November 11, 2022  5:26 PM

## 2022-11-11 NOTE — OP NOTE
Orthopedic  Operative Note    Pre-operative diagnosis: 1.  Cervical myelopathy   2.  Cervical radiculopathy   3.  Morbid obesity, BMI 42   4.  Chronic pain with fibromyalgia   5.  Multiple cardiac comorbidities   6.  Diabetes mellitus type 2    Post-operative diagnosis: 1.  Cervical myelopathy   2.  Cervical radiculopathy   3.  Morbid obesity, BMI 42   4.  Chronic pain with fibromyalgia   5.  Multiple cardiac comorbidities   6.  Diabetes mellitus type 2    Procedure: 1.  C7-T1 posterior cervical fusion with [employer] instrumentation   2.  C7 dome laminectomy   3.  T1 dome laminectomy   4.  Local autograft and demineralized bone matrix bone grafting   5.  O-arm with Stealth navigation   6.  Left C5-6 laminoforaminotomy   7.  EMG, SSEP, MEP neuro monitoring    Surgeon: Sushant Reveles MD    Assistant(s): Suzanne Maher PA-C is an experienced first surgical assistant whose assistance was necessary for patient positioning, hemostasis, soft tissue and neural retraction, closure, and safe progression of surgery.    Anesthesia: General endotracheal anesthesia    Estimated blood loss: 50 mL     Drains: Hemovac    Specimens: None    Indications:                               The patient is a 67-year-old female presented my clinic with florid progressive myelopathy secondary to severe cervical stenosis and hypermobility at C7-T1.  She elected for surgical intervention given her rapid decline and functional status.    I again reviewed the operative indications, the general and specific risks, benefits, and rehabilitation issues. Details of the procedure and postoperative recovery is highlighted. Patient and attending family appear to understand the issues and express their consent proceed.     Findings: Hypermobility C7-T1    Complications: None     Procedure Detail: The patient was evaluated in the preoperative holding area.  She was given the opportunity ask questions regarding the procedure in detail, and provided  informed consent to proceed.  Her posterior cervical spine was marked with the surgeon's initials at the anticipated level of the incision.  She was brought back to the operative suite on a gurney.  There, she was inducted under general anesthesia by our anesthesia colleagues.  A preflush motor was conducted to assess for baseline.  She was fitted for a Jarrett clamp, and flipped prone onto a four-point Tucker frame.  The Jarrett was secured in position.  All bony prominences were carefully padded.  The shoulders were retracted using tape, and the back was prepped and draped in the typical sterile fashion after shaving the posterior aspect of the hairline.  A preprocedural pause was performed to identify the correct patient, laterality, procedure to be performed, as well as administration of preoperative antibiotics.    I then initiated the procedure by palpating the C7 spinous process and plotting an incision based on that.  I reinjected using 0.25% Marcaine with epinephrine.  I incised the skin using a 10 blade.  The subcuticular layers were divided using electrocautery, and the fascia was divided using electrocautery.  I subperiosteally dissected the tissues off of the side of the spinous process and lamina of C7 and T1, taking care to clear off the transverse process at T1.  I also extended my exposure to include the left-sided medial facet of C5-6 for my laminoforaminotomy.  Once I was fully exposed, I placed an O-arm spinous process clamp on the T2 spinous process, and obtained an O-arm spin.  Following the spin, I confirmed that we were in fact on the T2 spinous process, to ensure appropriate levels.  Of note, there was hypermobility of the C7-T1 interspace with significant gapping of the facet joints at this level suggesting significant positional mobility.  I then set about establishing screw tracks.  I used a navigated bur tip to initiate a  hole and C7-T1 bilaterally.  I used a awl tip tap to  undertapped the trajectory of each screw, and palpated the trajectory of each screw to ensure there was no obvious breaches.  Once this was completed, I sealed the holes using Floseal, and set about my decompression.  I removed the spinous process of C7, and this was morselized for bone graft purposes.  I then used a high-speed bur to resect the inferior lamina of C7 up to the insertion of the ligamentum flavum in a dome fashion.  I then performed a similar dome laminectomy of C7 to ensure that I had resected all bony material past the margin of the ligamentum flavum.  I then used a up-biting curette to mobilize the ligamentum flavum, and used a series of Kerrisons to remove this without applying any pressure on the spinal cord.  Once this was completed, there was no obvious residual compression on the spinal cord.  I then placed the screws in each of the screw tracks using navigation, and stimulated each screw.  All screws stimulated above a 12 with the exception of the left-sided C7 screw, which stimulated at 8.  I then performed a laminoforaminotomy at C5-C6.  This involves resecting the inferior articular process along the medial aspect of the C5-6 facet joints so that I could fully visualize the superior articular process.  I then thinned the superior articular process using a high-speed bur, and removed superior articular process material using up-biting curettes.  Once this was completed, I was able to palpate the trajectory of the nerve root, and found no significant compression up to the pedicle of C6.  I then obtained another O-arm spin.  It appeared that the C7 screw trajectory was fine, but I removed it to palpate the trajectory.  I did not note any significant abnormality along the tract, so I replaced the screw.  Upon restimulated and the screw, it stimulated at a 12.  I then copiously irrigated the wound.  The transverse processes of T1 bilaterally were decorticated, as was the lamina of C7  bilaterally.  I took care to decorticate the facet joints at C7-T1 as well.  I placed rods bilaterally.  I placed morselized autograft as well as demineralized bone matrix, taking care to pack graft material into the facet joint at C7-T1.  The set plugs were final tightened, and hemostasis was evaluated and achieved using aqua mantis, bipolar electrocautery, and Floseal.  I then introduced 1 g of vancomycin powder into the wound, and placed a 10 Bengali Hemovac drain.  The muscle was approximated using 1 Vicryl suture, fascia was closed using 1 Vicryl suture, the subcuticular layer was closed using 2-0 Vicryl suture, and the skin was closed using a 3-0 strata fix.  Skin glue was then applied, along with a Primapore dressing.    All sponge and needle counts were correct at the end.  The patient tolerated the procedure without any apparent complication.  Neuro monitoring was stable throughout the procedure with regular motor checks.    ADDENDUM: During patient flip onto OR bed off [place], the Jarrett got snagged on the Jarrett murphy and shifted on the patient's scalp, resulting in a laceration over the left temporal area.  We reset positioning and flipped again atraumatically.  I used a clippers to clear the hair over the area of laceration and placed several staples to re-approximate the skin after washing the area with alcohol. After closure with staples, there was no residual bleeding, and a prima pore dressing was applied. Given the potential that this could have impacted the hardware, I ordered a stat postoperative CT scan.  This showed excellent hardware positioning and no evidence of fracture or other concern.  Staples will be removed at the patient's 2 week postoperative visit, and wound care instructions were provided in the interm.    Condition: Stable     Weight bearing status: Weight bearing as tolerated     Activity:      Anticoagulation plan:    Plan:      Sushant Reveles MD  [facility]  Orthopedics  Date:  11/11/2022  4:25 PM   Activity as tolerated  Patient may move about with assist as indicated or with supervision    Ambulation and mechanical prophylaxis.    The patient will be transferred to the floor once she clears PACU criteria.  She will mobilize for DVT prophylaxis, and receive postoperative Ancef for antibiotic prophylaxis.  The patient will need to be in a  brace for 3 months.  She will be on strict no heavy lifting, twisting, or bending requirements until that time as well.  She will follow-up with me in approximately 2 weeks for follow-up visit.

## 2022-11-12 ENCOUNTER — APPOINTMENT (OUTPATIENT)
Dept: OCCUPATIONAL THERAPY | Facility: CLINIC | Age: 67
DRG: 472 | End: 2022-11-12
Attending: ORTHOPAEDIC SURGERY
Payer: MEDICARE

## 2022-11-12 ENCOUNTER — APPOINTMENT (OUTPATIENT)
Dept: PHYSICAL THERAPY | Facility: CLINIC | Age: 67
DRG: 472 | End: 2022-11-12
Attending: ORTHOPAEDIC SURGERY
Payer: MEDICARE

## 2022-11-12 PROBLEM — D72.829 LEUKOCYTOSIS: Status: ACTIVE | Noted: 2022-11-12

## 2022-11-12 PROBLEM — F41.9 ANXIETY: Status: ACTIVE | Noted: 2022-11-12

## 2022-11-12 LAB
ANION GAP SERPL CALCULATED.3IONS-SCNC: 11 MMOL/L (ref 5–18)
BUN SERPL-MCNC: 16 MG/DL (ref 8–22)
CALCIUM SERPL-MCNC: 9.7 MG/DL (ref 8.5–10.5)
CHLORIDE BLD-SCNC: 101 MMOL/L (ref 98–107)
CO2 SERPL-SCNC: 25 MMOL/L (ref 22–31)
CREAT SERPL-MCNC: 0.77 MG/DL (ref 0.6–1.1)
ERYTHROCYTE [DISTWIDTH] IN BLOOD BY AUTOMATED COUNT: 13.4 % (ref 10–15)
GFR SERPL CREATININE-BSD FRML MDRD: 84 ML/MIN/1.73M2
GLUCOSE BLD-MCNC: 140 MG/DL (ref 70–125)
HCT VFR BLD AUTO: 39.7 % (ref 35–47)
HGB BLD-MCNC: 13.1 G/DL (ref 11.7–15.7)
MCH RBC QN AUTO: 30.8 PG (ref 26.5–33)
MCHC RBC AUTO-ENTMCNC: 33 G/DL (ref 31.5–36.5)
MCV RBC AUTO: 93 FL (ref 78–100)
PLATELET # BLD AUTO: 218 10E3/UL (ref 150–450)
POTASSIUM BLD-SCNC: 4.4 MMOL/L (ref 3.5–5)
RBC # BLD AUTO: 4.26 10E6/UL (ref 3.8–5.2)
SODIUM SERPL-SCNC: 137 MMOL/L (ref 136–145)
WBC # BLD AUTO: 12.5 10E3/UL (ref 4–11)

## 2022-11-12 PROCEDURE — 250N000011 HC RX IP 250 OP 636: Performed by: HOSPITALIST

## 2022-11-12 PROCEDURE — 85027 COMPLETE CBC AUTOMATED: CPT | Performed by: ORTHOPAEDIC SURGERY

## 2022-11-12 PROCEDURE — 120N000001 HC R&B MED SURG/OB

## 2022-11-12 PROCEDURE — 82310 ASSAY OF CALCIUM: CPT | Performed by: ORTHOPAEDIC SURGERY

## 2022-11-12 PROCEDURE — 97535 SELF CARE MNGMENT TRAINING: CPT | Mod: GO

## 2022-11-12 PROCEDURE — 99223 1ST HOSP IP/OBS HIGH 75: CPT | Performed by: HOSPITALIST

## 2022-11-12 PROCEDURE — 97166 OT EVAL MOD COMPLEX 45 MIN: CPT | Mod: GO

## 2022-11-12 PROCEDURE — 36415 COLL VENOUS BLD VENIPUNCTURE: CPT | Performed by: ORTHOPAEDIC SURGERY

## 2022-11-12 PROCEDURE — 97116 GAIT TRAINING THERAPY: CPT | Mod: GP | Performed by: PHYSICAL THERAPIST

## 2022-11-12 PROCEDURE — 250N000013 HC RX MED GY IP 250 OP 250 PS 637: Performed by: HOSPITALIST

## 2022-11-12 PROCEDURE — 97530 THERAPEUTIC ACTIVITIES: CPT | Mod: GP | Performed by: PHYSICAL THERAPIST

## 2022-11-12 PROCEDURE — 250N000013 HC RX MED GY IP 250 OP 250 PS 637: Performed by: ORTHOPAEDIC SURGERY

## 2022-11-12 PROCEDURE — 97162 PT EVAL MOD COMPLEX 30 MIN: CPT | Mod: GP | Performed by: PHYSICAL THERAPIST

## 2022-11-12 PROCEDURE — 250N000011 HC RX IP 250 OP 636: Performed by: ORTHOPAEDIC SURGERY

## 2022-11-12 RX ORDER — NALOXONE HYDROCHLORIDE 0.4 MG/ML
0.2 INJECTION, SOLUTION INTRAMUSCULAR; INTRAVENOUS; SUBCUTANEOUS
Status: DISCONTINUED | OUTPATIENT
Start: 2022-11-12 | End: 2022-11-15 | Stop reason: HOSPADM

## 2022-11-12 RX ORDER — OXYCODONE HYDROCHLORIDE 15 MG/1
15 TABLET ORAL
Status: DISCONTINUED | OUTPATIENT
Start: 2022-11-12 | End: 2022-11-12

## 2022-11-12 RX ORDER — OXYCODONE HYDROCHLORIDE 15 MG/1
15 TABLET ORAL
Status: DISCONTINUED | OUTPATIENT
Start: 2022-11-12 | End: 2022-11-15 | Stop reason: HOSPADM

## 2022-11-12 RX ORDER — DIPHENHYDRAMINE HCL 25 MG
25 CAPSULE ORAL DAILY PRN
Status: DISCONTINUED | OUTPATIENT
Start: 2022-11-12 | End: 2022-11-13

## 2022-11-12 RX ORDER — NALOXONE HYDROCHLORIDE 0.4 MG/ML
0.4 INJECTION, SOLUTION INTRAMUSCULAR; INTRAVENOUS; SUBCUTANEOUS
Status: DISCONTINUED | OUTPATIENT
Start: 2022-11-12 | End: 2022-11-15 | Stop reason: HOSPADM

## 2022-11-12 RX ORDER — HYDRALAZINE HYDROCHLORIDE 20 MG/ML
5 INJECTION INTRAMUSCULAR; INTRAVENOUS EVERY 4 HOURS PRN
Status: DISCONTINUED | OUTPATIENT
Start: 2022-11-12 | End: 2022-11-15 | Stop reason: HOSPADM

## 2022-11-12 RX ORDER — HYDROMORPHONE HCL IN WATER/PF 6 MG/30 ML
0.4 PATIENT CONTROLLED ANALGESIA SYRINGE INTRAVENOUS
Status: DISCONTINUED | OUTPATIENT
Start: 2022-11-12 | End: 2022-11-15 | Stop reason: HOSPADM

## 2022-11-12 RX ORDER — OXYCODONE HYDROCHLORIDE 5 MG/1
10 TABLET ORAL
Status: DISCONTINUED | OUTPATIENT
Start: 2022-11-12 | End: 2022-11-15 | Stop reason: HOSPADM

## 2022-11-12 RX ADMIN — LATANOPROST 1 DROP: 50 SOLUTION/ DROPS OPHTHALMIC at 20:19

## 2022-11-12 RX ADMIN — ACETAMINOPHEN 975 MG: 325 TABLET, FILM COATED ORAL at 10:43

## 2022-11-12 RX ADMIN — HYDROMORPHONE HYDROCHLORIDE 0.8 MG: 1 INJECTION, SOLUTION INTRAMUSCULAR; INTRAVENOUS; SUBCUTANEOUS at 00:44

## 2022-11-12 RX ADMIN — HYDROMORPHONE HYDROCHLORIDE 0.8 MG: 1 INJECTION, SOLUTION INTRAMUSCULAR; INTRAVENOUS; SUBCUTANEOUS at 14:21

## 2022-11-12 RX ADMIN — LEVOTHYROXINE SODIUM 88 MCG: 0.09 TABLET ORAL at 08:40

## 2022-11-12 RX ADMIN — SENNOSIDES AND DOCUSATE SODIUM 1 TABLET: 50; 8.6 TABLET ORAL at 20:18

## 2022-11-12 RX ADMIN — CEFAZOLIN SODIUM 2 G: 2 INJECTION, SOLUTION INTRAVENOUS at 04:11

## 2022-11-12 RX ADMIN — OXYCODONE HYDROCHLORIDE 10 MG: 5 TABLET ORAL at 18:09

## 2022-11-12 RX ADMIN — METOPROLOL SUCCINATE 50 MG: 50 TABLET, EXTENDED RELEASE ORAL at 20:18

## 2022-11-12 RX ADMIN — ACETAMINOPHEN 975 MG: 325 TABLET, FILM COATED ORAL at 18:09

## 2022-11-12 RX ADMIN — PANTOPRAZOLE SODIUM 40 MG: 20 TABLET, DELAYED RELEASE ORAL at 08:40

## 2022-11-12 RX ADMIN — LORAZEPAM 0.5 MG: 0.5 TABLET ORAL at 02:26

## 2022-11-12 RX ADMIN — OXYCODONE HYDROCHLORIDE 10 MG: 5 TABLET ORAL at 10:47

## 2022-11-12 RX ADMIN — DIPHENHYDRAMINE HYDROCHLORIDE 25 MG: 25 CAPSULE ORAL at 15:01

## 2022-11-12 RX ADMIN — HYDROMORPHONE HYDROCHLORIDE 0.8 MG: 1 INJECTION, SOLUTION INTRAMUSCULAR; INTRAVENOUS; SUBCUTANEOUS at 08:46

## 2022-11-12 RX ADMIN — HYDRALAZINE HYDROCHLORIDE 5 MG: 20 INJECTION, SOLUTION INTRAMUSCULAR; INTRAVENOUS at 10:43

## 2022-11-12 RX ADMIN — HYDROMORPHONE HYDROCHLORIDE 0.8 MG: 1 INJECTION, SOLUTION INTRAMUSCULAR; INTRAVENOUS; SUBCUTANEOUS at 03:59

## 2022-11-12 RX ADMIN — ACETAMINOPHEN 975 MG: 325 TABLET, FILM COATED ORAL at 02:12

## 2022-11-12 RX ADMIN — SENNOSIDES AND DOCUSATE SODIUM 1 TABLET: 50; 8.6 TABLET ORAL at 08:41

## 2022-11-12 RX ADMIN — HYDROMORPHONE HYDROCHLORIDE 0.4 MG: 0.2 INJECTION, SOLUTION INTRAMUSCULAR; INTRAVENOUS; SUBCUTANEOUS at 22:46

## 2022-11-12 ASSESSMENT — ACTIVITIES OF DAILY LIVING (ADL)
ADLS_ACUITY_SCORE: 35
ADLS_ACUITY_SCORE: 26
CONCENTRATING,_REMEMBERING_OR_MAKING_DECISIONS_DIFFICULTY: NO
TOILETING_ISSUES: NO
ADLS_ACUITY_SCORE: 37
ADLS_ACUITY_SCORE: 39
HEARING_DIFFICULTY_OR_DEAF: NO
EQUIPMENT_CURRENTLY_USED_AT_HOME: OTHER (SEE COMMENTS)
DIFFICULTY_EATING/SWALLOWING: NO
WALKING_OR_CLIMBING_STAIRS_DIFFICULTY: YES
ADLS_ACUITY_SCORE: 41
ADLS_ACUITY_SCORE: 35
DIFFICULTY_COMMUNICATING: NO
ADLS_ACUITY_SCORE: 30
CHANGE_IN_FUNCTIONAL_STATUS_SINCE_ONSET_OF_CURRENT_ILLNESS/INJURY: NO
ADLS_ACUITY_SCORE: 26
ADLS_ACUITY_SCORE: 41
DOING_ERRANDS_INDEPENDENTLY_DIFFICULTY: NO
FALL_HISTORY_WITHIN_LAST_SIX_MONTHS: NO
ADLS_ACUITY_SCORE: 35
WEAR_GLASSES_OR_BLIND: NO
DRESSING/BATHING_DIFFICULTY: YES
WALKING_OR_CLIMBING_STAIRS: AMBULATION DIFFICULTY, ASSISTANCE 1 PERSON
ADLS_ACUITY_SCORE: 26
DRESSING/BATHING: BATHING DIFFICULTY, ASSISTANCE 1 PERSON
ADLS_ACUITY_SCORE: 35

## 2022-11-12 NOTE — PROGRESS NOTES
Care Management Initial Consult    General Information  Assessment completed with:  ,    Type of CM/SW Visit: Chart Assessment    Primary Care Provider verified and updated as needed:     Readmission within the last 30 days:           Advance Care Planning:            Communication Assessment  Patient's communication style: spoken language (English or Bilingual)    Hearing Difficulty or Deaf: no        Cognitive  Cognitive/Neuro/Behavioral: .WDL except, mood/behavior  Level of Consciousness: alert  Arousal Level: opens eyes spontaneously  Orientation: oriented x 4  Mood/Behavior: anxious          Living Environment:   People in home: spouse     Current living Arrangements:        Able to return to prior arrangements:         Family/Social Support:  Care provided by:    Provides care for:                  Description of Support System:           Current Resources:   Patient receiving home care services:       Community Resources:    Equipment currently used at home: shower chair, raised toilet seat, grab bar, tub/shower  Supplies currently used at home:      Employment/Financial:  Employment Status:          Financial Concerns:             Lifestyle & Psychosocial Needs:  Social Determinants of Health     Tobacco Use: Low Risk      Smoking Tobacco Use: Never     Smokeless Tobacco Use: Never     Passive Exposure: Not on file   Alcohol Use: Not on file   Financial Resource Strain: Not on file   Food Insecurity: Not on file   Transportation Needs: Not on file   Physical Activity: Not on file   Stress: Not on file   Social Connections: Not on file   Intimate Partner Violence: Not on file   Depression: Not on file   Housing Stability: Not on file       Functional Status:  Prior to admission patient needed assistance:              Mental Health Status:          Chemical Dependency Status:                Values/Beliefs:  Spiritual, Cultural Beliefs, Cheondoism Practices, Values that affect care:                 Additional  Information:  3:37 PM  Chart reviewed.  Pt considering TCU vs home care.  CM to follow up 11/13.  Pt has TCU lists for [facility] and [facility] (pt lives in [facility]).  Anticipate daughter transport.    MEAGAN Barney

## 2022-11-12 NOTE — PROGRESS NOTES
11/12/22 1125   Appointment Info   Signing Clinician's Name / Credentials (PT) Luis Antonio hernandez DPT   Living Environment   People in Home spouse   Current Living Arrangements house   Home Accessibility other (see comments)   Transportation Anticipated family or friend will provide   Living Environment Comments Patient will reside on one level.   Self-Care   Activity/Exercise/Self-Care Comment Patient reports indepdnence with mobility at baseline.   General Information   Onset of Illness/Injury or Date of Surgery 11/11/22   Referring Physician Sushant Reveles MD   Patient/Family Therapy Goals Statement (PT) return to home if safe to do so, otherwise pt is open to TCU   Pertinent History of Current Problem (include personal factors and/or comorbidities that impact the POC) cervical myelopathy, CERVICAL 7 - THORACIC 1 LAMINECTOMY AND FUSION, LEFT CERVICAL 5 - CERVICAL 6 LAMINOFORAMINOTOMY WITH STEALTH NAVIGATION   Existing Precautions/Restrictions   (brace at all times)   Cognition   Affect/Mental Status (Cognition) anxious   Strength (Manual Muscle Testing)   Strength Comments generalized weakness   Bed Mobility   Bed Mobility supine-sit;sit-supine   Supine-Sit [place] (Bed Mobility) minimum assist (75% patient effort)   Sit-Supine [place] (Bed Mobility) minimum assist (75% patient effort)   Bed Mobility Limitations decreased ability to use arms for pushing/pulling;decreased ability to use legs for bridging/pushing;impaired ability to control trunk for mobility   Impairments Contributing to Impaired Bed Mobility decreased strength   Assistive Device (Bed Mobility) bed rails   Transfers   Transfers sit-stand transfer   Maintains Weight-bearing Status (Transfers) able to maintain   Sit-Stand Transfer   Sit-Stand [place] (Transfers) contact guard   Assistive Device (Sit-Stand Transfers) walker, front-wheeled   Gait/Stairs (Locomotion)   [place] Level (Gait) supervision   Assistive Device (Gait)  walker, front-wheeled   Distance in Feet (Required for LE Total Joints) 100   Distance in Feet (Gait) 80   Deviations/Abnormal Patterns (Gait) scout decreased   Maintains Weight-bearing Status (Gait) able to maintain   Clinical Impression   Criteria for Skilled Therapeutic Intervention Yes, treatment indicated   PT Diagnosis (PT) impaired functional moblity   Influenced by the following impairments weakness, pain, anxiety, WB and post-surgical precautions   Functional limitations due to impairments ambulation, transfers, bed mobiliyt   Clinical Presentation (PT Evaluation Complexity) Evolving/Changing   Clinical Presentation Rationale presents as diagnosed   Clinical Decision Making (Complexity) moderate complexity   Planned Therapy Interventions (PT) balance training;gait training;transfer training;home exercise program   Risk & Benefits of therapy have been explained evaluation/treatment results reviewed;care plan/treatment goals reviewed;risks/benefits reviewed;current/potential barriers reviewed;participants voiced agreement with care plan;participants included;patient   PT Total Evaluation Time   PT Eval, Moderate Complexity Minutes (28283) 15   Plan of Care Review   Plan of Care Reviewed With patient   Physical Therapy Goals   PT Frequency Daily   PT Predicted Duration/Target Date for Goal Attainment 11/16/22   PT Goals Transfers;Gait;PT Goal 1;Bed Mobility   PT: Bed Mobility Modified independent;Supine to/from sit;Within precautions   PT: Transfers Modified independent;Sit to/from stand;Assistive device;Within precautions   PT: Gait 100 feet;Modified independent;Within precautions   PT: Goal 1 I with HEP   Interventions   Interventions Quick Adds Gait Training;Therapeutic Activity   Therapeutic Activity   Therapeutic Activities: dynamic activities to improve functional performance Minutes (67099) 10   Symptoms Noted During/After Treatment Fatigue   Treatment Detail/Skilled Intervention STS x 3 from EOB,  recliner, commode. Suellen to Calvin. VCs for safety and procedure.   Gait Training   Gait Training Minutes (63473) 15   Symptoms Noted During/After Treatment (Gait Training) fatigue   Treatment Detail/Skilled Intervention Saulo pushed herself to walk for distance. CGA, then SBA with FWW. Patient fatigued and began to behave unsafely during last 15 feet of walk, moving away from FWW And grabbing at furniture. VCs corrected course and return to FWW, as well as safe return to recliner.   [place] Level (Gait Training) contact guard   Physical Assistance Level (Gait Training) supervision;verbal cues;nonverbal cues (demo/gestures)   Weight Bearing (Gait Training) weight-bearing as tolerated   Assistive Device (Gait Training) rolling walker   Gait Analysis Deviations decreased scout;increased time in double stance;decreased step length;decreased stride length;increased stride width;decreased swing-to-stance ratio;decreased toe-to-floor clearance;decreased weight-shifting ability   Impairments (Gait Analysis/Training) balance impaired;pain;strength decreased   PT Discharge Planning   PT Plan bed mobility, ambulation, HEP   PT Discharge Recommendation (DC Rec) home with assist;home with home care physical therapy;Transitional Care Facility   PT Rationale for DC Rec Patient mobilizing well today but does require assistance. If continues to improve, home with home PT would be recommendation. If progress stalls, TCU is advised.   PT Brief overview of current status Needs to meet therapy goals.   Total Session Time   Timed Code Treatment Minutes 25   Total Session Time (sum of timed and untimed services) 40

## 2022-11-12 NOTE — PLAN OF CARE
Patient vital signs are at baseline: No,  Reason:  Pt had elevated BP. Pt reporting sever pain and pt was very anxious. PRN 0.8 mg IV dilaudid and Lorazepam utilized with some relief.   Patient able to ambulate as they were prior to admission or with assist devices provided by therapies during their stay:  No,  Reason:  Pt reporting severe pain. Pt stated she will wait for PT for first time getting up from of bed.   Patient MUST void prior to discharge:  No Reason:  Due to void.  Patient able to tolerate oral intake:  No,  Reason: Pt is still on clear liquid diet.    Pain has adequate pain control using Oral analgesics:  No,  Reason: Pt requiring IV Dilaudid for severe pain.      Does patient have an identified :  Yes  Has goal D/C date and time been discussed with patient:  Yes  Goal Outcome Evaluation:

## 2022-11-12 NOTE — PROGRESS NOTES
Received consult page towards end of shift. Pt is still in rosalia-op. I have done med rec. No rate controllers PTA. PRN hydral ordered for BP and clonidine held. Full consult tomorrow

## 2022-11-12 NOTE — PLAN OF CARE
"Patient vital signs are at baseline: No,  Reason:  Pt has elevated Bps since arrival from PACU; managing with scheduled BP meds and PRN IV hydralazine x 1.  Patient able to ambulate as they were prior to admission or with assist devices provided by therapies during their stay:  No,  Reason:  Pt reports severe pain, not yet OOB since surgery.  Patient MUST void prior to discharge:  No,  Reason:  Pt has shah in place.  Patient able to tolerate oral intake:  Yes Pt tolerating clears, states does not want anything more to eat this evening.  Pain has adequate pain control using Oral analgesics:  No,  Reason:  Paged on call provider with [facility] regarding issues with pain management as pt feels that pain \"keeps going up and up\" and worsening.  Does patient have an identified :  Yes  Has goal D/C date and time been discussed with patient:  Yes                              "

## 2022-11-12 NOTE — CONSULTS
MEDICINE CONSULT    Physician requesting consult: Dr. Reveles  Reason for consult: medical comanagement     Assessment and Plan:    Mary Jo Vallejo is a 67 year old old female with:    Principal Problem:    Cervical myelopathy (H) (11/11/2022)  Active Problems:    Hypothyroidism (3/11/2011)    Benign essential HTN (8/14/2013)    Atrial flutter (H) (11/11/2022)    Will try to bring down BP. Suspect anxiety and pain, and post-op status contribute to current readings. Stop IVF. Increase frequency of hydralazine. Hold criteria added for lisinopril and metoprolol - Cr stable  Sinus on exam, no bradycardia on B-blocker  Home synthroid continued  Expect leukocytosis is post-op, no infectious S/Sx currently, CTM    Status-post C7-T1 laminectomy and fusion left, C5-C6 laminal foraminotomy performed on 11/11/22  Medical history/pre-op/office notes reviewed as available  EBL: 50  More recent Hb 12.7 (oct), Cr 0.75  Hemoglobin   Date Value Ref Range Status   10/26/2013 9.6 (L) 11.7 - 15.7 g/dL Final     Creatinine   Date Value Ref Range Status   11/04/2013 0.95 0.52 - 1.04 mg/dL Final       PPx: defer to surgery, will comment if medicine input requested     History:    Mary Jo Vallejo is a 67 year old old female:     AF occurred while sedated/intubated for MRI of neck, resolved within 6 hours and had no associated Sx. Echo Oct 2022 with normal biventricular function. EKG personally reviewed (very poor scan quality)    Feels good now, she has pursued a positive attitude. Not having neuropathy. Has right hip pain and B/L elbow pain. Has difficulty with PO pain meds due to IBS, has been working on this with GI to prevent diarrhea, used FODMAP, also doing EMDR weekly for PTSD  BP is elevated 174/83,   Diet managed DM  Fibromyalgia - ongoing for decades. Sx worsened after Respiratory illness in March  MRI brain showed small stroke, seen by Neurology, this was a silent event  Leukocytosis 12.5    Denies history of MI,  VTE  Denies heavy alcohol use  Denies tobacco use    Medical History  @[email or web address]@  [unfilled]  Patient Active Problem List    Diagnosis Date Noted     HTN (hypertension) 08/14/2013     Priority: High     Type 2 diabetes, HbA1c goal < 7% (H) 03/11/2011     Priority: High     Onset 2/22/11 hospitalized with Glucose 750.  Started on Lantus, then glucophage added.       Hypothyroidism 03/11/2011     Priority: High     Fibromyalgia 06/08/2001     Priority: High     Cervical myelopathy (H) 11/11/2022     Priority: Medium     Adverse effect of benzodiazepine 02/26/2014     Priority: Medium     Has had addictive cravings for valium and would like to avoid this med in the future.  Ativan has not been a problem.       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     Past Surgical History:   Procedure Laterality Date     HC DRAIN/INJ MAJOR JOINT/BURSA W/O US  10/24/13    RT     LAPAROSCOPIC CHOLECYSTOENTEROSTOMY  1990's    Cholecystectomy, Laparoscopic     [facility] APPENDECTOMY       [facility] NONSPECIFIC PROCEDURE  1/19/12    @ [place], histiocytic giant cell inflammation, interior border of (L) hyoid bone, cervical exploration, excision of central hyoid bone and adjacent strap musculature     [facility] TOTAL KNEE ARTHROPLASTY  10/24/13    LT     [facility] VAG HYST,RMV TUBE/OVARY      both ovaries gone- non cancerous reason       Allergies  Allergies   Allergen Reactions     Valium [Diazepam] Anaphylaxis     Gets very  obstenant     Erythromycin Nausea and Vomiting     Augmentin Nausea and Vomiting     Noted in 6/6/09 ER     Bee Venom Swelling     Food      Multiple food allergies, ask patient     Penicillins Itching     Simvastatin Muscle Pain (Myalgia)     Adhesive Tape Rash     silicvone       Prior to Admission Medications   Medications Prior to Admission   Medication Sig Dispense Refill Last Dose     acetaminophen (TYLENOL) 500 MG tablet Take 1,000 mg by mouth every 6 hours as needed for mild pain   11/10/2022     carboxymethylcellulose PF (REFRESH LIQUIGEL) 1 % ophthalmic gel Place 1 drop into both eyes 4 times daily as needed        cloNIDine (CATAPRES) 0.1 MG tablet Take 0.1 mg by mouth daily as needed For sbp>180        clotrimazole-betamethasone (LOTRISONE) 1-0.05 % external cream Apply topically 2 times daily as needed        HYDROcodone-acetaminophen (NORCO) 5-325 MG tablet Take 0.5-1 tablets by mouth every 4 hours as needed for severe pain   11/10/2022     latanoprost (XALATAN) 0.005 % ophthalmic solution Place 1 drop into both eyes At Bedtime   11/10/2022 at Pt brought     levothyroxine (SYNTHROID/LEVOTHROID) 88 MCG tablet Take 88 mcg by mouth daily   11/11/2022 at AM     lisinopril (ZESTRIL) 40 MG tablet Take 40 mg by mouth At Bedtime   11/10/2022 at PM     LORazepam (ATIVAN) 0.5 MG tablet Take 0.5 mg by mouth every 6 hours as needed for anxiety        metoprolol succinate ER (TOPROL XL) 50 MG 24 hr tablet Take 50 mg by mouth At Bedtime   11/10/2022 at PM     omeprazole 20 MG tablet Take 20 mg by mouth daily   11/11/2022 at AM     senna-docusate (SENOKOT-S/PERICOLACE) 8.6-50 MG tablet Take 1 tablet by mouth 2 times daily   11/11/2022 at AM     ORDER FOR DME CPAP    Certification: Initial    Auto PAP minimum pressure  6 cm H20  Auto PAP maximum pressure 12 cm H2O     Interface: Patient preference     Provide replacement interface, tubing and filter supplies as needed     Humidifier heated, climateline, Chin strap,  add in future at patient's request      Duration of need: 15 months  Diagnosis HUBER  Instruction to vendor: Please provide patient with mask interface of patient's choice 1 each 0        Social History  Reviewed, and she  reports that she has never smoked. She has never used smokeless tobacco. She reports that she does not currently use alcohol. She reports that she does not use drugs.  Social History     Tobacco Use     Smoking status: Never     Smokeless tobacco: Never     Tobacco comments:     no second hand smoke at home or work   Substance Use Topics     Alcohol use: Not Currently     Comment: rare- 1 drink every 3 months or so       Family History  Reviewed, and family history includes Breast Cancer in her maternal aunt and paternal grandmother; Breast Cancer (age of onset: 33) in her sister; Cancer in her maternal grandfather and paternal aunt; Cancer - colorectal (age of onset: 60) in her father; Depression in her mother; EYE* in her father; Hypertension in her mother; Neurologic Disorder in her father.    Review of Systems  All pertinent positives and negatives reviewed in History.  All other 12 point review of systems reviewed and negative.      Objective:    Physical Exam  Constitutional: Appears nad in the chair, Body mass index is 42.21 kg/m .  Eyes: Anicteric, non-pallorous conjunctiva, glasses  HENT: dentition intact, mucous membranes moist  Neck: No masses, no lymphadenopathy, trachea midline, in neck brace  Lungs: CTA B/L, normal WOB  Cardiovascular: RRR, no murmurs/rubs  Gastrointestinal: Soft, nontender, bowel sounds present  Extremities: no deformity, moves all 4 ext, minimal pitting ankle edema  Skin: Normal temperature and texture, no rashes or ulcers of visible skin  Psych: Normal mood and  Anxious affect, alert and oriented ×3    Cardiographics  ECG: personally reviewed, see above     Imaging  XR Surgery RIOS  Fluoro G/T 5 Min  This exam was marked as non-reportable because it will not be read  by a   radiologist or a [place] non-radiologist provider.       Lab Review   No visits with results within 2 Month(s) from this visit.   Latest known visit with results is:   Office Visit on 02/26/2014   Component Date Value     Hemoglobin A1C 02/26/2014 6.0        Appreciate the opportunity to participate in the care of Mary Jo VICKY Saldañaemily, please feel free to contact for any questions or concerns     Murali Cardenas MD, MPH  [facility]  Office # [phone]

## 2022-11-12 NOTE — PLAN OF CARE
Patient vital signs are at baseline: No,  Reason:  Elevated BP, PRN Hydralazine administered with effect.   Patient able to ambulate as they were prior to admission or with assist devices provided by therapies during their stay:  Yes  Patient MUST void prior to discharge:  Yes  Patient able to tolerate oral intake:  Yes  Pain has adequate pain control using Oral analgesics:  No,  Reason:  Continues to have elevated pain, IV Dilaudid utilized.   Does patient have an identified :  Yes  Has goal D/C date and time been discussed with patient:  Yes    Patient's neck pain managed with PRN Oxycodone and IV Dilaudid. Participated in PT/OT. 2 PVR's completed. Tolerating oral intake. [place] brace and hemovac remain in place. Will continue to monitor.

## 2022-11-12 NOTE — PROGRESS NOTES
Occupational Therapy     11/12/22 3199   Appointment Info   Signing Clinician's Name / Credentials (OT) Ciarra Baron OT   Living Environment   People in Home spouse   Current Living Arrangements house   Home Accessibility other (see comments)   Transportation Anticipated family or friend will provide   Living Environment Comments multi-level home; pt states she can perform ADLs on one level   Self-Care   Usual Activity Tolerance fair   Current Activity Tolerance fair   Equipment Currently Used at Home shower chair;raised toilet seat;grab bar, tub/shower   Activity/Exercise/Self-Care Comment Pt reports ind with toileting; some assist with LB dressing and showering   Instrumental Activities of Daily Living (IADL)   IADL Comments Pt reports spouse has assisted with all IADLs for the past several months   General Information   Onset of Illness/Injury or Date of Surgery 11/11/22   Referring Physician Sushant Reveles   Patient/Family Therapy Goal Statement (OT) to regain as much independence as possible with household tasks; improve activity tolerance   Existing Precautions/Restrictions spinal;other (see comments)  (cervical;  brace at all times)   Cognitive Status Examination   Orientation Status orientation to person, place and time   Behavioral Issues overwhelmed easily   Affect/Mental Status (Cognitive) emotionally labile;anxious   Follows Commands WFL;other (see comments)  (breakdown of tasks and step by step directions to assist with anxiety management)   Range of Motion Comprehensive   Comment, General Range of Motion BUE WFL; NFT due to pain   Strength Comprehensive (MMT)   Comment, General Manual Muscle Testing (MMT) Assessment BUE WFL; NFT due to pain/lifting restrictions   Bed Mobility   Bed Mobility supine-sit   Supine-Sit [place] (Bed Mobility) moderate assist (50% patient effort)   Assistive Device (Bed Mobility) bed rails;draw sheet   Transfers   Transfers sit-stand transfer   Sit-Stand  Transfer   Sit-Stand [place] (Transfers) minimum assist (75% patient effort)   Assistive Device (Sit-Stand Transfers) walker, front-wheeled   Activities of Daily Living   BADL Assessment/Intervention lower body dressing   Lower Body Dressing Assessment/Training   Comment, (Lower Body Dressing) Dependent to don shoes in session due to pain/decr act maurice   [place] Level (Lower Body Dressing) dependent (less than 25% patient effort)   Clinical Impression   Criteria for Skilled Therapeutic Interventions Met (OT) Yes, treatment indicated   OT Diagnosis Decreased ADL independence   OT Problem List-Impairments impacting ADL activity tolerance impaired;pain;post-surgical precautions;fear & anxiety   Assessment of Occupational Performance 5 or more Performance Deficits   Identified Performance Deficits dressing, toileting, bathing, transfers, bed mob, cooking, cleaning   Planned Therapy Interventions (OT) ADL retraining;transfer training   Clinical Decision Making Complexity (OT) moderate complexity   Anticipated Equipment Needs Upon Discharge (OT) dressing equipment   Risk & Benefits of therapy have been explained evaluation/treatment results reviewed;care plan/treatment goals reviewed;patient   OT Total Evaluation Time   OT Eval, Moderate Complexity Minutes (33344) 5   OT Goals   Therapy Frequency (OT) Daily   OT Predicted Duration/Target Date for Goal Attainment 11/16/22   OT Goals Lower Body Dressing;Hygiene/Grooming;Toilet Transfer/Toileting   OT: Hygiene/Grooming modified independent;within precautions   OT: Lower Body Dressing Modified independent;within precautions   OT: Toilet Transfer/Toileting Modified independent   Interventions   Interventions Quick Adds Self-Care/Home Management   Self-Care/Home Management   Self-Care/Home Mgmt/ADL, Compensatory, Meal Prep Minutes (59863) 38   Symptoms Noted During/After Treatment (Meal Preparation/Planning Training) increased pain   Treatment Detail/Skilled  Intervention Pt presents with significant anxiety; requires additional time to complete tasks. Pt educ on cervical precautions- verbalized understanding. Pt completed supine>sit with HOB elevated and use of handrail; Min/Mod A of 1 with verbal cues for logroll technique and hand placement. Once seated EOB, pt c/o increased pain and required seated rest prior to continuing with activity in session. Pt completed sit<>stand transfers at EOB and chair with FWW and CGA; verbal cues and step by step direction for task. Pt took a few steps from EOB to chair with FWW and CGA; verbal cues for safety and walker navigation. Pt emotionally labile/overwhelmed throughout. Extended time required to complete tasks. Pt educated on breathing and visualization strategies for pain/management and coping.   OT Discharge Planning   OT Plan 11/16- toileting, g/h, dressing with AE   OT Discharge Recommendation (DC Rec) (S)  Transitional Care Facility;home with home care occupational therapy;home with assist   OT Rationale for DC Rec Pt currently requires A of 1 with all mobility and for ADLs/IADLs. Pending progress with therapy, pt may be able to d/c home with family support and Home OT to facilitate increased safety/independence with ADLs and functional mob.   OT Brief overview of current status Min/Mod A bed mob; CGA functional mob in room. Decr act maurice   Total Session Time   Timed Code Treatment Minutes 38   Total Session Time (sum of timed and untimed services) 43

## 2022-11-12 NOTE — ANESTHESIA POSTPROCEDURE EVALUATION
Patient: Mary Jo Vallejo    Procedure: Procedure(s):  CERVICAL 7 - THORACIC 1 LAMINECTOMY AND FUSION, LEFT CERVICAL 5 - CERVICAL 6 LAMINOFORAMINOTOMY WITH STEALTH NAVIGATION       Anesthesia Type:  General    Note:     Postop Pain Control: Uneventful            Sign Out: Well controlled pain   PONV: No   Neuro/Psych: Uneventful            Sign Out: Acceptable/Baseline neuro status   Airway/Respiratory: Uneventful            Sign Out: Acceptable/Baseline resp. status   CV/Hemodynamics: Uneventful            Sign Out: Acceptable CV status; No obvious hypovolemia; No obvious fluid overload   Other NRE: NONE   DID A NON-ROUTINE EVENT OCCUR? No           Last vitals:  Vitals Value Taken Time   /66 11/11/22 1910   Temp 36.6  C (97.9  F) 11/11/22 1740   Pulse 49 11/11/22 1919   Resp 16 11/11/22 1910   SpO2 96 % 11/11/22 1919   Vitals shown include unvalidated device data.    Electronically Signed By: Dasia Rascon MD  November 11, 2022  7:21 PM

## 2022-11-13 ENCOUNTER — APPOINTMENT (OUTPATIENT)
Dept: OCCUPATIONAL THERAPY | Facility: CLINIC | Age: 67
DRG: 472 | End: 2022-11-13
Attending: ORTHOPAEDIC SURGERY
Payer: MEDICARE

## 2022-11-13 ENCOUNTER — APPOINTMENT (OUTPATIENT)
Dept: PHYSICAL THERAPY | Facility: CLINIC | Age: 67
DRG: 472 | End: 2022-11-13
Attending: ORTHOPAEDIC SURGERY
Payer: MEDICARE

## 2022-11-13 PROBLEM — L29.9 ITCHING: Status: ACTIVE | Noted: 2022-11-13

## 2022-11-13 PROBLEM — D72.829 LEUKOCYTOSIS: Status: RESOLVED | Noted: 2022-11-12 | Resolved: 2022-11-13

## 2022-11-13 LAB
ERYTHROCYTE [DISTWIDTH] IN BLOOD BY AUTOMATED COUNT: 13.8 % (ref 10–15)
HCT VFR BLD AUTO: 35.4 % (ref 35–47)
HGB BLD-MCNC: 11.5 G/DL (ref 11.7–15.7)
HOLD SPECIMEN: NORMAL
MCH RBC QN AUTO: 30.3 PG (ref 26.5–33)
MCHC RBC AUTO-ENTMCNC: 32.5 G/DL (ref 31.5–36.5)
MCV RBC AUTO: 93 FL (ref 78–100)
PLATELET # BLD AUTO: 182 10E3/UL (ref 150–450)
RBC # BLD AUTO: 3.8 10E6/UL (ref 3.8–5.2)
WBC # BLD AUTO: 9.7 10E3/UL (ref 4–11)

## 2022-11-13 PROCEDURE — 250N000013 HC RX MED GY IP 250 OP 250 PS 637: Performed by: HOSPITALIST

## 2022-11-13 PROCEDURE — 99233 SBSQ HOSP IP/OBS HIGH 50: CPT | Performed by: HOSPITALIST

## 2022-11-13 PROCEDURE — 250N000013 HC RX MED GY IP 250 OP 250 PS 637: Performed by: ORTHOPAEDIC SURGERY

## 2022-11-13 PROCEDURE — 85027 COMPLETE CBC AUTOMATED: CPT | Performed by: ORTHOPAEDIC SURGERY

## 2022-11-13 PROCEDURE — 97530 THERAPEUTIC ACTIVITIES: CPT | Mod: GP | Performed by: PHYSICAL THERAPIST

## 2022-11-13 PROCEDURE — 120N000001 HC R&B MED SURG/OB

## 2022-11-13 PROCEDURE — 36415 COLL VENOUS BLD VENIPUNCTURE: CPT | Performed by: ORTHOPAEDIC SURGERY

## 2022-11-13 PROCEDURE — 97535 SELF CARE MNGMENT TRAINING: CPT | Mod: GO

## 2022-11-13 PROCEDURE — 97116 GAIT TRAINING THERAPY: CPT | Mod: GP | Performed by: PHYSICAL THERAPIST

## 2022-11-13 PROCEDURE — 250N000011 HC RX IP 250 OP 636: Performed by: ORTHOPAEDIC SURGERY

## 2022-11-13 RX ORDER — DIPHENHYDRAMINE HCL 25 MG
25 CAPSULE ORAL EVERY 6 HOURS PRN
Status: DISCONTINUED | OUTPATIENT
Start: 2022-11-13 | End: 2022-11-15

## 2022-11-13 RX ADMIN — OXYCODONE HYDROCHLORIDE 15 MG: 15 TABLET ORAL at 21:10

## 2022-11-13 RX ADMIN — OXYCODONE HYDROCHLORIDE 10 MG: 5 TABLET ORAL at 06:31

## 2022-11-13 RX ADMIN — PANTOPRAZOLE SODIUM 40 MG: 20 TABLET, DELAYED RELEASE ORAL at 08:43

## 2022-11-13 RX ADMIN — HYDROMORPHONE HYDROCHLORIDE 0.8 MG: 1 INJECTION, SOLUTION INTRAMUSCULAR; INTRAVENOUS; SUBCUTANEOUS at 08:46

## 2022-11-13 RX ADMIN — LEVOTHYROXINE SODIUM 88 MCG: 0.09 TABLET ORAL at 08:43

## 2022-11-13 RX ADMIN — SENNOSIDES AND DOCUSATE SODIUM 1 TABLET: 50; 8.6 TABLET ORAL at 08:44

## 2022-11-13 RX ADMIN — HYDROMORPHONE HYDROCHLORIDE 0.4 MG: 0.2 INJECTION, SOLUTION INTRAMUSCULAR; INTRAVENOUS; SUBCUTANEOUS at 17:45

## 2022-11-13 RX ADMIN — METOPROLOL SUCCINATE 50 MG: 50 TABLET, EXTENDED RELEASE ORAL at 21:08

## 2022-11-13 RX ADMIN — LISINOPRIL 40 MG: 40 TABLET ORAL at 21:08

## 2022-11-13 RX ADMIN — DIPHENHYDRAMINE HYDROCHLORIDE 25 MG: 25 CAPSULE ORAL at 02:43

## 2022-11-13 RX ADMIN — POLYETHYLENE GLYCOL 3350 17 G: 17 POWDER, FOR SOLUTION ORAL at 08:44

## 2022-11-13 RX ADMIN — ACETAMINOPHEN 975 MG: 325 TABLET, FILM COATED ORAL at 01:40

## 2022-11-13 RX ADMIN — SENNOSIDES AND DOCUSATE SODIUM 1 TABLET: 50; 8.6 TABLET ORAL at 21:08

## 2022-11-13 RX ADMIN — OXYCODONE HYDROCHLORIDE 10 MG: 5 TABLET ORAL at 01:40

## 2022-11-13 RX ADMIN — DIPHENHYDRAMINE HYDROCHLORIDE 25 MG: 25 CAPSULE ORAL at 14:51

## 2022-11-13 RX ADMIN — OXYCODONE HYDROCHLORIDE 10 MG: 5 TABLET ORAL at 11:41

## 2022-11-13 RX ADMIN — ACETAMINOPHEN 975 MG: 325 TABLET, FILM COATED ORAL at 11:39

## 2022-11-13 RX ADMIN — LATANOPROST 1 DROP: 50 SOLUTION/ DROPS OPHTHALMIC at 21:12

## 2022-11-13 RX ADMIN — ACETAMINOPHEN 975 MG: 325 TABLET, FILM COATED ORAL at 17:45

## 2022-11-13 ASSESSMENT — ACTIVITIES OF DAILY LIVING (ADL)
ADLS_ACUITY_SCORE: 26
ADLS_ACUITY_SCORE: 28
ADLS_ACUITY_SCORE: 32
ADLS_ACUITY_SCORE: 26
ADLS_ACUITY_SCORE: 32
ADLS_ACUITY_SCORE: 26
ADLS_ACUITY_SCORE: 28
ADLS_ACUITY_SCORE: 32

## 2022-11-13 NOTE — PLAN OF CARE
Patient vital signs are at baseline: Yes  Patient able to ambulate as they were prior to admission or with assist devices provided by therapies during their stay:  Yes  Patient MUST void prior to discharge:  Yes  Patient able to tolerate oral intake:  Yes  Pain has adequate pain control using Oral analgesics:  No,  Reason:  Patient receives IV Dilaudid   Does patient have an identified :  Yes  Has goal D/C date and time been discussed with patient:  Yes      Pain is controlled with 0.8 mg IV dilaudid and 10 mg PO oxycodone. Ambulated with PT. Hemovac output 10 mL, drain pulled due to parameters. Will continue to monitor labs, vitals, pain, and surgical incision sites.

## 2022-11-13 NOTE — PROGRESS NOTES
"                  [facility] Progress Note      Post-operative Day: 2 Days Post-Op    Procedure(s):  CERVICAL 7 - THORACIC 1 LAMINECTOMY AND FUSION, LEFT CERVICAL 5 - CERVICAL 6 LAMINOFORAMINOTOMY WITH STEALTH NAVIGATION      Subjective:  Just moved to chair so a little more painful.  Reports some returning sensation and movement function to lower extremities, but still reports weakness and difficulty particularly with right hip motion.    Pain: moderate  Chest pain, SOB:  No      Objective:  Blood pressure 127/62, pulse 66, temperature 97.2  F (36.2  C), temperature source Oral, resp. rate 16, height 1.626 m (5' 4\"), weight 112.5 kg (248 lb), SpO2 95 %.    Patient Vitals for the past 24 hrs:   BP Temp Temp src Pulse Resp SpO2 Weight   11/13/22 0817 127/62 97.2  F (36.2  C) Oral 66 16 95 % --   11/13/22 0500 106/60 97.9  F (36.6  C) Oral -- -- 97 % 112.5 kg (248 lb)   11/13/22 0100 100/57 98.1  F (36.7  C) Oral 88 16 96 % --   11/12/22 2009 109/58 98.4  F (36.9  C) Oral 85 16 95 % --   11/12/22 1552 (!) 164/83 98.4  F (36.9  C) Oral 81 16 95 % --   11/12/22 1334 (!) 152/75 98.4  F (36.9  C) Oral 76 16 95 % --   11/12/22 1133 (!) 145/72 -- -- -- -- -- --   11/12/22 1035 (!) 181/81 -- -- -- -- -- --       Wt Readings from Last 4 Encounters:   11/13/22 112.5 kg (248 lb)       Sitting up in chair with  in place. NAD.  Did become emotional a couple of times as she discusses some return of functions or sensation that was impacted pre-surgery.  Motor function, sensation, and circulation grossly intact for the upper and lower extremities. She has some decreased strength to the right triceps, wrist flexors and hand intrinsics, and decreased strength to the right hip flexor complex.  Wound status: incisions are clean dry and intact. Yes. Head laceration intact and dry.  Post op dressing intact. Yes  Calf tenderness: Bilateral  No, reports normal fibromyalgia discomfort.    Pertinent Labs   Lab Results: " personally reviewed.     Recent Labs   Lab Test 11/13/22  0652 11/12/22  0949   HGB 11.5* 13.1   HCT 35.4 39.7   MCV 93 93    218   NA  --  137       Plan: Anticoagulation protocol: scoanticoagulationlist2: Mechanical and/or ambulation               Pain medications:  scopainmedication: oxycodone, dilaudid and tylenol            Weight bearing status:  WBAT.  Keep  in place.            Disposition:  TCU, patient will need TCU due to significant pre-surgery deconditioning from spinal cord compression.            Continue cares and rehabilitation     Report completed by:  Todd Jeramie Holter, PA, PA  Date: 11/13/2022  Time: 8:38 AM

## 2022-11-13 NOTE — PROGRESS NOTES
Care Management Follow Up    Length of Stay (days): 2    Expected Discharge Date: 11/14/2022     Concerns to be Addressed:       Patient plan of care discussed at interdisciplinary rounds: Yes    Anticipated Discharge Disposition: Home     Anticipated Discharge Services:    Anticipated Discharge DME:      Patient/family educated on Medicare website which has current facility and service quality ratings:    Education Provided on the Discharge Plan:    Patient/Family in Agreement with the Plan:      Referrals Placed by CM/KISHA:    Private pay costs discussed: Not applicable    Additional Information:  2:25 PM  KISHA met with patient and  to discuss discharge needs.  Pt is requesting TCU.  K[facility]   to follow up around referrals.      MEAGAN Barney

## 2022-11-13 NOTE — PROGRESS NOTES
"[facility] MEDICINE PROGRESS NOTE      Identification/Summary: Mary Jo Vallejo is a 67 year old female with a past medical history of pAF, obesity, HTN who was admitted on 11/11/2022 for elective spinal surgery.      Assessment and Plan:  Principal Problem:    Cervical myelopathy (H)  Active Problems:    Fibromyalgia    Hypothyroidism    Benign essential HTN    Atrial flutter (H)    Anxiety    Itching     BP control is more acceptable today. No changes    Hb has dropped. Continue monitoring for clinical signs of blood loss and the surgical site    WBC is normalized    PRN benadryl ordered for itching with good response    Reducing dose of IV dilaudid as we need to work towards PO discharge medications      Clinically Significant Risk Factors                       # Severe Obesity: Estimated body mass index is 42.57 kg/m  as calculated from the following:    Height as of this encounter: 1.626 m (5' 4\").    Weight as of this encounter: 112.5 kg (248 lb)., PRESENT ON ADMISSION         Diet: Regular Diet Adult  DVT Prophylaxis:  Defer to primary service  Code Status: Full Code    Anticipated possible discharge in 1 days to 2 once per ortho  Disposition Plan      Expected Discharge Date: 11/14/2022        Discharge Comments: Waiting for TCU placement        The patient's care was discussed with the Bedside Nurse and Patient.    Murali Cardenas MD, MPH  Hospitalist,Hospital Medicine  [facility]: [facility]  Phone: #[phone]    Interval History/Subjective:  Pain was uncontrolled, now better  Eating breakfast okay. Voiding  BP high/then low  Still has Sx improvement from pre-op \"haven't had 'zaps' or pins and needles\"  Has required IV dilaudid  Hb 11.5  TCU bound  BP control 120's/60's, on  1x hydral past 24 hours  No shoulder shrug weakness   Triceps weakness right, no numbness  Denies cP or SOB    Physical Exam/Objective:  I personally reviewed the vital signs for the past 24 " hours  Wt Readings from Last 5 Encounters:   11/13/22 112.5 kg (248 lb)   02/26/14 106.7 kg (235 lb 3.7 oz)   01/08/14 104.7 kg (230 lb 13.2 oz)   11/08/13 111.8 kg (246 lb 7.6 oz)   10/03/13 113.7 kg (250 lb 10.6 oz)     Body mass index is 42.57 kg/m .    Constitutional: Appears NAD in her chair  Eyes: Anicteric, non-pallorous conjunctiva, glasses  HENT: dentition poor, mucous membranes moist  Neck: No masses, no lymphadenopathy, trachea midline  Lungs: CTA B/L, normal WOB  Cardiovascular: rrr, no murmur  Gastrointestinal: Soft, nontender, bowel sounds present  Extremities: no deformity, no edema, moves all 4 extremities, weakness of right tri/ext  Skin: Normal temperature and texture, no rashes or ulcers of visible skin  Psych: Normal mood and somewhat anxious affect, alert    Data reviewed today: I personally reviewed all new medications, labs, imaging/diagnostics reports over the past 24 hours.    Medications:   Personally Reviewed.  Medications       acetaminophen  975 mg Oral Q8H     latanoprost  1 drop Both Eyes At Bedtime     levothyroxine  88 mcg Oral Daily     lisinopril  40 mg Oral At Bedtime     metoprolol succinate ER  50 mg Oral At Bedtime     multivitamin, therapeutic  1 tablet Oral Daily     pantoprazole  40 mg Oral Daily     polyethylene glycol  17 g Oral Daily     senna-docusate  1 tablet Oral BID

## 2022-11-13 NOTE — PLAN OF CARE
Patient vital signs are at baseline: Yes  Patient able to ambulate as they were prior to admission or with assist devices provided by therapies during their stay:  Yes  Patient MUST void prior to discharge:  Yes  Patient able to tolerate oral intake:  Yes  Pain has adequate pain control using Oral analgesics:  Yes  Does patient have an identified :  Yes  Has goal D/C date and time been discussed with patient:  Yes    Pt is A&Ox4. Pt rated pain 5-7/10 during shift thus far. Managed with prn and scheduled medications. Some redness across chin, pt states from reaction to wash cloth that was placed on her neck during the day. PRN benadryl x1. Dressing is CDI. Improving sensation per pt. Assist of 1-2 with walker for transferring. Saline locked. Voiding adequately with purewick in place. Education on medication administration, alarms, and use of call-light to reduce risk for falls and injury. No further issues noted. CMS intact. VSS, no shortness of breath or nausea.

## 2022-11-13 NOTE — PLAN OF CARE
Problem: Pain Chronic (Persistent) (Comorbidity Management)  Goal: Acceptable Pain Control and Functional Ability  Outcome: Progressing  Intervention: Develop Pain Management Plan  Recent Flowsheet Documentation  Taken 11/12/2022 1530 by Ajay Spear RN  Pain Management Interventions: repositioned  Intervention: Manage Persistent Pain  Recent Flowsheet Documentation  Taken 11/12/2022 1809 by Ajay Spear, RN  Medication Review/Management: medications reviewed   Goal Outcome Evaluation:    Pt is having pain to her neck area, PRN Oxycodone and IV dilaudid used for pain. TELE: NSR x1. HS metoprolol held due to BP parameters. Assist of one to the bathroom, vitally stable. Tolerating her diet. Anxiety is better this evening.

## 2022-11-14 ENCOUNTER — APPOINTMENT (OUTPATIENT)
Dept: PHYSICAL THERAPY | Facility: CLINIC | Age: 67
DRG: 472 | End: 2022-11-14
Attending: ORTHOPAEDIC SURGERY
Payer: MEDICARE

## 2022-11-14 ENCOUNTER — APPOINTMENT (OUTPATIENT)
Dept: RADIOLOGY | Facility: CLINIC | Age: 67
DRG: 472 | End: 2022-11-14
Attending: ORTHOPAEDIC SURGERY
Payer: MEDICARE

## 2022-11-14 ENCOUNTER — APPOINTMENT (OUTPATIENT)
Dept: OCCUPATIONAL THERAPY | Facility: CLINIC | Age: 67
DRG: 472 | End: 2022-11-14
Attending: ORTHOPAEDIC SURGERY
Payer: MEDICARE

## 2022-11-14 PROBLEM — K59.03 THERAPEUTIC OPIOID INDUCED CONSTIPATION: Status: ACTIVE | Noted: 2022-11-14

## 2022-11-14 PROBLEM — T40.2X5A THERAPEUTIC OPIOID INDUCED CONSTIPATION: Status: ACTIVE | Noted: 2022-11-14

## 2022-11-14 PROCEDURE — 97535 SELF CARE MNGMENT TRAINING: CPT | Mod: GO

## 2022-11-14 PROCEDURE — 250N000013 HC RX MED GY IP 250 OP 250 PS 637: Performed by: ORTHOPAEDIC SURGERY

## 2022-11-14 PROCEDURE — 97110 THERAPEUTIC EXERCISES: CPT | Mod: GP

## 2022-11-14 PROCEDURE — 250N000013 HC RX MED GY IP 250 OP 250 PS 637: Performed by: HOSPITALIST

## 2022-11-14 PROCEDURE — 99232 SBSQ HOSP IP/OBS MODERATE 35: CPT | Performed by: HOSPITALIST

## 2022-11-14 PROCEDURE — 120N000001 HC R&B MED SURG/OB

## 2022-11-14 PROCEDURE — 999N000065 XR CERVICAL SPINE PORT 2/3 VIEWS

## 2022-11-14 PROCEDURE — 250N000011 HC RX IP 250 OP 636: Performed by: ORTHOPAEDIC SURGERY

## 2022-11-14 RX ADMIN — HYDROMORPHONE HYDROCHLORIDE 0.4 MG: 0.2 INJECTION, SOLUTION INTRAMUSCULAR; INTRAVENOUS; SUBCUTANEOUS at 00:57

## 2022-11-14 RX ADMIN — OXYCODONE HYDROCHLORIDE 15 MG: 15 TABLET ORAL at 07:34

## 2022-11-14 RX ADMIN — OXYCODONE HYDROCHLORIDE 10 MG: 5 TABLET ORAL at 18:56

## 2022-11-14 RX ADMIN — SENNOSIDES AND DOCUSATE SODIUM 1 TABLET: 50; 8.6 TABLET ORAL at 09:31

## 2022-11-14 RX ADMIN — ACETAMINOPHEN 975 MG: 325 TABLET, FILM COATED ORAL at 09:30

## 2022-11-14 RX ADMIN — SENNOSIDES AND DOCUSATE SODIUM 1 TABLET: 50; 8.6 TABLET ORAL at 21:03

## 2022-11-14 RX ADMIN — LORAZEPAM 0.5 MG: 0.5 TABLET ORAL at 16:55

## 2022-11-14 RX ADMIN — OXYCODONE HYDROCHLORIDE 10 MG: 5 TABLET ORAL at 14:34

## 2022-11-14 RX ADMIN — METOPROLOL SUCCINATE 50 MG: 50 TABLET, EXTENDED RELEASE ORAL at 21:03

## 2022-11-14 RX ADMIN — LATANOPROST 1 DROP: 50 SOLUTION/ DROPS OPHTHALMIC at 21:03

## 2022-11-14 RX ADMIN — PANTOPRAZOLE SODIUM 40 MG: 20 TABLET, DELAYED RELEASE ORAL at 09:30

## 2022-11-14 RX ADMIN — LEVOTHYROXINE SODIUM 88 MCG: 0.09 TABLET ORAL at 09:30

## 2022-11-14 RX ADMIN — OXYCODONE HYDROCHLORIDE 10 MG: 5 TABLET ORAL at 21:58

## 2022-11-14 RX ADMIN — LISINOPRIL 40 MG: 40 TABLET ORAL at 21:03

## 2022-11-14 RX ADMIN — POLYETHYLENE GLYCOL 3350 17 G: 17 POWDER, FOR SOLUTION ORAL at 09:31

## 2022-11-14 RX ADMIN — ACETAMINOPHEN 975 MG: 325 TABLET, FILM COATED ORAL at 02:34

## 2022-11-14 RX ADMIN — OXYCODONE HYDROCHLORIDE 15 MG: 15 TABLET ORAL at 10:36

## 2022-11-14 ASSESSMENT — ACTIVITIES OF DAILY LIVING (ADL)
ADLS_ACUITY_SCORE: 32
ADLS_ACUITY_SCORE: 32
ADLS_ACUITY_SCORE: 33
ADLS_ACUITY_SCORE: 32
ADLS_ACUITY_SCORE: 32
ADLS_ACUITY_SCORE: 29
ADLS_ACUITY_SCORE: 33
ADLS_ACUITY_SCORE: 32

## 2022-11-14 NOTE — PLAN OF CARE
Goal Outcome Evaluation:  Patient vital signs are at baseline: Yes  Patient able to ambulate as they were prior to admission or with assist devices provided by therapies during their stay:  Yes  Patient MUST void prior to discharge:  Yes  Patient able to tolerate oral intake:  No,  Reason:  Yes  Pain has adequate pain control using Oral analgesics:  No,  Reason:  Utilized IV Dilaudid   Does patient have an identified :  Yes  Has goal D/C date and time been discussed with patient:  Yes. Awaiting TCU placement.   Patient is alert and oriented X 4. Received scheduled Tylenol, PRN Oral Dilaudid and PRN Oxycodone for pain control. Slept in the recliner. Up to the bathroom and voiding spontaneously. IV saline locked. Call light within reach.

## 2022-11-14 NOTE — PLAN OF CARE
Problem: Spinal Surgery  Goal: Optimal Functional Ability  Outcome: Progressing  Intervention: Optimize Functional Status  Recent Flowsheet Documentation  Taken 11/13/2022 2110 by Brandi Lowe, RN  Activity Management:   activity adjusted per tolerance   ambulated in room   up in stretcher chair  Positioning/Transfer Devices:   pillows   in use     Problem: Spinal Surgery  Goal: Optimal Pain Control and Function  Intervention: Prevent or Manage Pain  Recent Flowsheet Documentation  Taken 11/13/2022 2110 by Brandi Lowe, RN  Pain Management Interventions: medication (see MAR)   Goal Outcome Evaluation:    Pt is alert and oriented, a little bit anxious. Wears the neck collar and torso brace. Ambulated to bathroom with assist of 1 but c/o pain 10/10. Oxycodone 15mg administered and pain went down to 3. VSS.

## 2022-11-14 NOTE — CONSULTS
Integrative Therapy Consult    Healing PresenceYes  Essential Oils: Topical (EO/Topical Oil), Inhalation (EO/Topical oil)     Calming Blend - HC, Lavender Massage Oil - HC       Healing Music: TV/Care channel     Breathwork:       Guided Imagery:       Acupressure: Hands on Li4     Oshibori:       Energy Therapy:       Healing Touch:       Reiki:       Qi Gong:     Massage: Hand, Foot      Targeted Massage:    Sleep Promotion:       Other Therapy:       Intervention Reason: Anxiety/Stress, Pain     Pre and Post Session Scores: Patient Desires Treatment: yes  Pre-Session Anxiety: 10  Post-session Anxiety: 6     Pre-session Pain: 9 Post-session Pain: 5               Delivery:         Referrals:      Fátima Powers

## 2022-11-14 NOTE — PROGRESS NOTES
"                  [facility] Progress Note      Post-operative Day:3 Days Post-Op    Procedure(s):  CERVICAL 7 - THORACIC 1 LAMINECTOMY AND FUSION, LEFT CERVICAL 5 - CERVICAL 6 LAMINOFORAMINOTOMY WITH STEALTH NAVIGATION      Subjective:  Pain control issues this am after missing narcotic dose overnight--focused on posterior cervical spine.  Reports improved sensation left arm and legs. Reports improved ambulation.  Pain: moderate  Chest pain, SOB:  No      Objective:  Blood pressure 112/57, pulse 58, temperature 97.8  F (36.6  C), temperature source Oral, resp. rate 17, height 1.626 m (5' 4\"), weight 112.5 kg (248 lb), SpO2 97 %.    Patient Vitals for the past 24 hrs:   BP Temp Temp src Pulse Resp SpO2   11/14/22 0325 112/57 97.8  F (36.6  C) Oral 58 17 97 %   11/14/22 0038 131/74 97.7  F (36.5  C) Oral 63 17 97 %   11/13/22 2142 120/59 97.9  F (36.6  C) Oral 76 16 96 %   11/13/22 2108 118/69 -- -- 88 -- --   11/13/22 1604 135/75 98.5  F (36.9  C) Oral 80 16 96 %   11/13/22 1210 114/56 97.2  F (36.2  C) Oral 73 16 94 %   11/13/22 0817 127/62 97.2  F (36.2  C) Oral 66 16 95 %       Wt Readings from Last 4 Encounters:   11/13/22 112.5 kg (248 lb)       Sitting up in chair with  in place.  Motor function, sensation, and circulation grossly intact for the upper and lower extremities. Strength testing grossly intact to me this am, with pain limited right triceps and biceps strength.  Wound status: incisions are clean dry and intact. Yes. Head laceration intact and dry.  Post op dressing intact. Yes  Calf tenderness: Bilateral  No, reports normal fibromyalgia discomfort.    Pertinent Labs   Lab Results: personally reviewed.     Recent Labs   Lab Test 11/13/22  0652 11/12/22  0949   HGB 11.5* 13.1   HCT 35.4 39.7   MCV 93 93    218   NA  --  137       Plan: Anticoagulation protocol: Mechanical and/or ambulation               Pain medications:  oxycodone, dilaudid and tylenol            Weight bearing " status:  WBAT.  Keep  in place.            Disposition:  TCU when accepted           Continue cares and rehabilitation     Report completed by:  Sushant Reveles MD  Date: 11/13/2022  Time: 8:38 AM

## 2022-11-14 NOTE — PLAN OF CARE
Problem: Spinal Surgery  Goal: Optimal Pain Control and Function  Outcome: Progressing  Intervention: Prevent or Manage Pain  Recent Flowsheet Documentation  Taken 11/14/2022 1434 by Rita Rae, RN  Pain Management Interventions:   medication (see MAR)   emotional support  Taken 11/14/2022 1036 by Rita Rae, RN  Pain Management Interventions: medication (see MAR)  Taken 11/14/2022 0930 by Rita Rae, RN  Pain Management Interventions:   medication (see MAR)   distraction   emotional support   essential oils     Oxycodone given every 3-4 hrs is effective pain control.  Anxiety is more an issue.  Pt needs reassurance, emotional support and a healing environment.  Pt IS moving well with SBA and a walker.  TCU tomorrow.

## 2022-11-14 NOTE — PROGRESS NOTES
Care Management Follow Up    Length of Stay (days): 3    Expected Discharge Date: 11/15/2022     Concerns to be Addressed:     Discharge Planning   Patient plan of care discussed at interdisciplinary rounds: Yes    Anticipated Discharge Disposition: Skilled Nursing Facility     Anticipated Discharge Services: Transportation Services ([facility] Wheelchair @ 12 PM)  Anticipated Discharge DME:      Patient/family educated on Medicare website which has current facility and service quality ratings:    Education Provided on the Discharge Plan:  Per team  Patient/Family in Agreement with the Plan: yes    Referrals Placed by CM/SW:    Private pay costs discussed: transportation costs    Additional Information:  -10:26 AM  SWCM left msg for Kylee with [facility] seeking a return call to discuss be availability.   -11:30  AM  SW spoke with Kylee with [facility].  They can accept patient 11/15/22 for private room.    -SWCM met with pt to discuss discharge planning. Pt requested wheelchair ride at the time of discharge. She was made aware the possibility of additional cost. Pt asked that SWCM follow up to ensure that the facility can accommodate food sensitives.   -1:49 PM   KISHACM arranged wheelchair transportation for 11/15/22 with [facility] with 12:00 PM  time.   -1:58 PM  SW L/M for Kylee to provide her with time of transportation for 11/15.   -PAS Needed.    JUAN DANIEL Mckenzie, [facility]    11/14/2022   2:07 PM

## 2022-11-14 NOTE — PROGRESS NOTES
"[facility] MEDICINE PROGRESS NOTE      Identification/Summary: Mary Jo Vallejo is a 67 year old female with a past medical history of pAF, obesity, HTN who was admitted on 11/11/2022 for elective spinal surgery.    Assessment and Plan:  Principal Problem:    Cervical myelopathy (H)  Active Problems:    Fibromyalgia    Hypothyroidism    Benign essential HTN    Atrial flutter (H)    Anxiety    Itching    Therapeutic opioid induced constipation     Hb has dropped. Continue monitoring for clinical signs of blood loss and the surgical site, drain removal today pending C-spine XR. Defer weakness mgmt to surgery. Hasn't needed IV dilaudid since last night    Continue maximal non-pharm anxiety mitigation discussed in room with patient today, motivational interviewing provided.    Still hasn't had BM, but passing gas. Miralax has been started. Can increase to mag-citrate or enema if no progress.    Awaiting TCU placement      Clinically Significant Risk Factors                       # Severe Obesity: Estimated body mass index is 42.57 kg/m  as calculated from the following:    Height as of this encounter: 1.626 m (5' 4\").    Weight as of this encounter: 112.5 kg (248 lb)., PRESENT ON ADMISSION         Diet: Regular Diet Adult  DVT Prophylaxis:  Defer to primary service  Code Status: Full Code    Anticipated possible discharge in 1 days to 2 once per ortho  Disposition Plan      Expected Discharge Date: 11/15/2022        Discharge Comments: Waiting for TCU placement        The patient's care was discussed with the Bedside Nurse and Patient.    Murali Cardenas MD, MPH  Hospitalist,Hospital Medicine  [facility]: [facility]  Phone: #[phone]    Interval History/Subjective:  BP controlled, afebrile, no cp, cough, sob, doing well with IS  Clarifies that the right arm ext weakness is new from her pre-op  She continues to be pleased with the improvement in her LE's post-op which is " Problem: Pain:  Description  Pain management should include both nonpharmacologic and pharmacologic interventions.   Goal: Pain level will decrease  Description  Pain level will decrease  1/4/2020 1943 by Tristian Chung RN  Outcome: Ongoing  1/4/2020 1942 by Tristian Chung RN  Outcome: Ongoing  1/4/2020 0626 by Lizbeth Loza RN  Outcome: Ongoing  Goal: Control of acute pain  Description  Control of acute pain  1/4/2020 1943 by Tristian Chung RN  Outcome: Ongoing  1/4/2020 1942 by Tristian Chung RN  Outcome: Ongoing  1/4/2020 0626 by Lizbeth Loza RN  Outcome: Ongoing  Goal: Control of chronic pain  Description  Control of chronic pain  1/4/2020 1943 by Tristian Chung RN  Outcome: Ongoing  1/4/2020 1942 by Tristian Chung RN  Outcome: Ongoing  1/4/2020 0626 by Lizbeth Loza RN  Outcome: Ongoing fantastic    Physical Exam/Objective:  I personally reviewed the vital signs for the past 24 hours  Wt Readings from Last 5 Encounters:   11/13/22 112.5 kg (248 lb)   02/26/14 106.7 kg (235 lb 3.7 oz)   01/08/14 104.7 kg (230 lb 13.2 oz)   11/08/13 111.8 kg (246 lb 7.6 oz)   10/03/13 113.7 kg (250 lb 10.6 oz)     Body mass index is 42.57 kg/m .    Constitutional: Appears NAD in her chair  Eyes: Anicteric, non-pallorous conjunctiva, glasses  HENT: dentition poor, mucous membranes moist  Neck: No masses, no lymphadenopathy, trachea midline  Lungs: CTA B/L, normal WOB  Cardiovascular: rrr, no murmur  Gastrointestinal: Soft, nontender  Extremities: no deformity, no edema, moves all 4 extremities, weakness of right tri/ext is unchanged  Skin: Normal temperature and texture, no rashes or ulcers of visible skin  Psych: Normal mood and intermittently anxious affect, alert, tearful at times    Data reviewed today: I personally reviewed all new medications, labs, imaging/diagnostics reports over the past 24 hours.    Medications:   Personally Reviewed.  Medications       latanoprost  1 drop Both Eyes At Bedtime     levothyroxine  88 mcg Oral Daily     lisinopril  40 mg Oral At Bedtime     metoprolol succinate ER  50 mg Oral At Bedtime     multivitamin, therapeutic  1 tablet Oral Daily     pantoprazole  40 mg Oral Daily     polyethylene glycol  17 g Oral Daily     senna-docusate  1 tablet Oral BID

## 2022-11-14 NOTE — PLAN OF CARE
Goal Outcome Evaluation:    Pt complains of pain to neck area, PRN dilaudid given for pain, with relief. Pt tolerating her diet. Voiding well, assist of one with ambulation. TELE: NSR. Vitally stable, afebrile.

## 2022-11-15 ENCOUNTER — DOCUMENTATION ONLY (OUTPATIENT)
Dept: OTHER | Facility: CLINIC | Age: 67
End: 2022-11-15

## 2022-11-15 ENCOUNTER — APPOINTMENT (OUTPATIENT)
Dept: PHYSICAL THERAPY | Facility: CLINIC | Age: 67
DRG: 472 | End: 2022-11-15
Attending: ORTHOPAEDIC SURGERY
Payer: MEDICARE

## 2022-11-15 ENCOUNTER — APPOINTMENT (OUTPATIENT)
Dept: OCCUPATIONAL THERAPY | Facility: CLINIC | Age: 67
DRG: 472 | End: 2022-11-15
Attending: ORTHOPAEDIC SURGERY
Payer: MEDICARE

## 2022-11-15 VITALS
TEMPERATURE: 97.9 F | WEIGHT: 253.9 LBS | DIASTOLIC BLOOD PRESSURE: 62 MMHG | OXYGEN SATURATION: 96 % | HEART RATE: 58 BPM | RESPIRATION RATE: 18 BRPM | SYSTOLIC BLOOD PRESSURE: 110 MMHG | HEIGHT: 64 IN | BODY MASS INDEX: 43.35 KG/M2

## 2022-11-15 PROBLEM — G57.11 MERALGIA PARESTHETICA OF RIGHT SIDE: Status: ACTIVE | Noted: 2022-11-15

## 2022-11-15 PROBLEM — F41.0 PANIC ATTACK: Status: ACTIVE | Noted: 2022-11-15

## 2022-11-15 LAB — SARS-COV-2 RNA RESP QL NAA+PROBE: NEGATIVE

## 2022-11-15 PROCEDURE — 97110 THERAPEUTIC EXERCISES: CPT | Mod: GP

## 2022-11-15 PROCEDURE — U0005 INFEC AGEN DETEC AMPLI PROBE: HCPCS | Performed by: HOSPITALIST

## 2022-11-15 PROCEDURE — 97535 SELF CARE MNGMENT TRAINING: CPT | Mod: GO

## 2022-11-15 PROCEDURE — 250N000013 HC RX MED GY IP 250 OP 250 PS 637: Performed by: ORTHOPAEDIC SURGERY

## 2022-11-15 PROCEDURE — 99233 SBSQ HOSP IP/OBS HIGH 50: CPT | Performed by: HOSPITALIST

## 2022-11-15 PROCEDURE — 97116 GAIT TRAINING THERAPY: CPT | Mod: GP

## 2022-11-15 RX ORDER — CALCIUM CARBONATE 500(1250)
4 TABLET ORAL DAILY
Qty: 1 TABLET | Refills: 0 | Status: SHIPPED | OUTPATIENT
Start: 2022-11-15

## 2022-11-15 RX ORDER — HYDROXYZINE HYDROCHLORIDE 10 MG/1
10 TABLET, FILM COATED ORAL EVERY 4 HOURS PRN
Status: DISCONTINUED | OUTPATIENT
Start: 2022-11-15 | End: 2022-11-15 | Stop reason: HOSPADM

## 2022-11-15 RX ORDER — HYDROXYZINE HYDROCHLORIDE 10 MG/1
10 TABLET, FILM COATED ORAL EVERY 4 HOURS PRN
Qty: 30 TABLET | Refills: 0 | Status: SHIPPED | OUTPATIENT
Start: 2022-11-15

## 2022-11-15 RX ORDER — HYDROXYZINE HYDROCHLORIDE 10 MG/1
10 TABLET, FILM COATED ORAL EVERY 4 HOURS PRN
Qty: 30 TABLET | Refills: 0 | Status: SHIPPED | OUTPATIENT
Start: 2022-11-15 | End: 2022-11-15

## 2022-11-15 RX ORDER — POLYETHYLENE GLYCOL 3350 17 G/17G
17 POWDER, FOR SOLUTION ORAL DAILY
Qty: 510 G | Refills: 0 | COMMUNITY
Start: 2022-11-15

## 2022-11-15 RX ORDER — LORAZEPAM 0.5 MG/1
0.5 TABLET ORAL EVERY 6 HOURS PRN
Qty: 16 TABLET | Refills: 0 | Status: SHIPPED | OUTPATIENT
Start: 2022-11-15 | End: 2022-11-19

## 2022-11-15 RX ORDER — MULTIVITAMIN
1 TABLET ORAL DAILY
Qty: 1 TABLET | Refills: 0 | Status: SHIPPED | OUTPATIENT
Start: 2022-11-15 | End: 2022-11-16

## 2022-11-15 RX ORDER — OXYCODONE HYDROCHLORIDE 10 MG/1
10 TABLET ORAL
Qty: 30 TABLET | Refills: 0 | Status: SHIPPED | OUTPATIENT
Start: 2022-11-15 | End: 2022-11-30

## 2022-11-15 RX ADMIN — OXYCODONE HYDROCHLORIDE 15 MG: 15 TABLET ORAL at 11:36

## 2022-11-15 RX ADMIN — POLYETHYLENE GLYCOL 3350 17 G: 17 POWDER, FOR SOLUTION ORAL at 07:58

## 2022-11-15 RX ADMIN — SENNOSIDES AND DOCUSATE SODIUM 1 TABLET: 50; 8.6 TABLET ORAL at 07:41

## 2022-11-15 RX ADMIN — THERA TABS 1 TABLET: TAB at 07:41

## 2022-11-15 RX ADMIN — LEVOTHYROXINE SODIUM 88 MCG: 0.09 TABLET ORAL at 07:41

## 2022-11-15 RX ADMIN — PANTOPRAZOLE SODIUM 40 MG: 20 TABLET, DELAYED RELEASE ORAL at 07:41

## 2022-11-15 RX ADMIN — LORAZEPAM 0.5 MG: 0.5 TABLET ORAL at 07:41

## 2022-11-15 RX ADMIN — OXYCODONE HYDROCHLORIDE 10 MG: 5 TABLET ORAL at 01:33

## 2022-11-15 RX ADMIN — OXYCODONE HYDROCHLORIDE 10 MG: 5 TABLET ORAL at 04:43

## 2022-11-15 ASSESSMENT — ACTIVITIES OF DAILY LIVING (ADL)
ADLS_ACUITY_SCORE: 32

## 2022-11-15 NOTE — PLAN OF CARE
Problem: Spinal Surgery  Goal: Optimal Coping with Surgery  Outcome: Progressing; patient utilizing integrative interventions to manage anxiety and pain including EMDR, aroma therapy, light rubbing of shoulder, deep breathing, relaxing lights in room, sipping on tea, and therapeutic conversation with staff.       Problem: Spinal Surgery  Goal: Optimal Pain Control and Function  Intervention: Prevent or Manage Pain; utilizing medications and integrative interventions as listed above.   Recent Flowsheet Documentation  Taken 11/15/2022 0232 by Adrianna Reeder RN  Pain Management Interventions: medication (see MAR)  Taken 11/15/2022 0133 by Adrianna Reeder RN  Pain Management Interventions: medication (see MAR)     Problem: Spinal Surgery  Goal: Effective Oxygenation and Ventilation  Outcome: Progressing; patient utilizing 1 liter of oxygen by nasal cannula and sitting with up at  45 degrees.       Problem: Obstructive Sleep Apnea Risk or Actual Comorbidity Management  Goal: Unobstructed Breathing During Sleep  Intervention: Monitor and Manage Obstructive Sleep Apnea  Recent Flowsheet Documentation  Taken 11/15/2022 0133 by Adrianna Reeder RN  Medication Review/Management:    high-risk medications identified    medications reviewed      Patient vital signs are at baseline: Yes  Patient able to ambulate as they were prior to admission or with assist devices provided by therapies during their stay:  Yes  Patient MUST void prior to discharge:  Yes  Patient able to tolerate oral intake:  Yes  Pain has adequate pain control using Oral analgesics:  Yes  Does patient have an identified :  Yes  Has goal D/C date and time been discussed with patient:  Yes    Patient has bilateral IVs placed that are saline locked. Patient has cervical brace in place, dressing is clean, dry, and intact. Patient ambulates up with assist of 1 with gait belt and walker. Patient's discharge plan is 11/15/22 at 1200 to [facility].

## 2022-11-15 NOTE — PROGRESS NOTES
"[facility] MEDICINE PROGRESS NOTE      Identification/Summary: Mary Jo Vallejo is a 67 year old female with a past medical history of pAF, obesity, HTN who was admitted on 11/11/2022 for elective spinal surgery.    Assessment and Plan:  Principal Problem:    Cervical myelopathy (H)  Active Problems:    Fibromyalgia    Hypothyroidism    Benign essential HTN    Atrial flutter (H)    Anxiety    Itching    Therapeutic opioid induced constipation    Meralgia paresthetica of right side    Panic attack     Patient was actively having a panic attack when I entered the room this morning.  Performed reassurance and retraction alongside nurse Lucy.  She did receive Ativan.  I remained in the room with her waiting for the ativan to kick in. We discussed her thought content, and how she overburdens herself trying to stay on top of all the medical interventions and monitoring her body. She agreed with me to practice \"surrendering\" to the process. Will add atarax prn, discontinue benadryl. Her right arm is unchanged from yesterday. From medical standpoint, feel she is appropriate for discharge if surgery would like.    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  We discussed nonpharmacologic mitigation methods.    She can come off O2. Discussed plan with RN  36 minutes spent in the care of this patient, greater than 50% of that time was spent counseling coordinating care as indicated in this note.    Clinically Significant Risk Factors                       # Severe Obesity: Estimated body mass index is 43.58 kg/m  as calculated from the following:    Height as of this encounter: 1.626 m (5' 4\").    Weight as of this encounter: 115.2 kg (253 lb 14.4 oz)., PRESENT ON ADMISSION         Diet: Regular Diet Adult  DVT Prophylaxis:  Defer to primary service  Code Status: Full Code    Anticipated possible discharge in 1 days to 2 once " per ortho  Disposition Plan     Expected Discharge Date: 11/15/2022, 12:00 PM      Discharge Comments: [facility] can accept on 11/15.   Health transport via wheelchair 11/15 @ 12pm        The patient's care was discussed with the Bedside Nurse and Patient.    Murali Cardenas MD, MPH  Hospitalist,[facility]: [facility]  Phone: #[phone]    Interval History/Subjective:  No CP, no SOB  VS stable, no new labs.    Physical Exam/Objective:  I personally reviewed the vital signs for the past 24 hours  Wt Readings from Last 5 Encounters:   11/15/22 115.2 kg (253 lb 14.4 oz)   02/26/14 106.7 kg (235 lb 3.7 oz)   01/08/14 104.7 kg (230 lb 13.2 oz)   11/08/13 111.8 kg (246 lb 7.6 oz)   10/03/13 113.7 kg (250 lb 10.6 oz)     Body mass index is 43.58 kg/m .    Constitutional: Appears panic in her chair  Eyes: Anicteric, non-pallorous conjunctiva, glasses  HENT: dentition poor, mucous membranes moist  Neck: No masses, no lymphadenopathy, trachea midline  Lungs: CTA B/L, normal WOB  Cardiovascular: rrr, no murmur  Gastrointestinal: Soft, nontender  Extremities: no deformity, no edema, moves all 4 extremities, weakness of right tri/ext is unchanged still  Skin: Normal temperature and texture, no rashes or ulcers of visible skin  Psych: Normal mood and panicky but redirectable    Data reviewed today: I personally reviewed all new medications, labs, imaging/diagnostics reports over the past 24 hours.    Medications:   Personally Reviewed.  Medications       latanoprost  1 drop Both Eyes At Bedtime     levothyroxine  88 mcg Oral Daily     lisinopril  40 mg Oral At Bedtime     metoprolol succinate ER  50 mg Oral At Bedtime     multivitamin, therapeutic  1 tablet Oral Daily     pantoprazole  40 mg Oral Daily     polyethylene glycol  17 g Oral Daily     senna-docusate  1 tablet Oral BID

## 2022-11-15 NOTE — PROGRESS NOTES
Occupational Therapy Discharge Summary    Reason for therapy discharge:    Discharged to transitional care facility.    Progress towards therapy goal(s). See goals on Care Plan in [facility] electronic health record for goal details.  Goals not met.  Barriers to achieving goals:   discharge from facility.    Therapy recommendation(s):    Continued therapy is recommended.  Rationale/Recommendations:  OT for ADLs.

## 2022-11-15 NOTE — PROGRESS NOTES
"                  [facility] Orthopedics Progress Note      Post-operative Day: 4 Days Post-Op    Procedure(s):  CERVICAL 7 - THORACIC 1 LAMINECTOMY AND FUSION, LEFT CERVICAL 5 - CERVICAL 6 LAMINOFORAMINOTOMY WITH STEALTH NAVIGATION    Plan: Anticoagulation protocol: Mechanical and/or ambulation               Pain medications:  oxycodone, dilaudid and tylenol            Weight bearing status:  WBAT.  Keep  in place.            Disposition:  TCU today at noon.            Continue cares and rehabilitation    Subjective:  Mary Jo is seated in her chair working with OT this morning when being seen. She is breathing deeply when I see her and struggles to stay focused as she acknowledges high anxiety with activity.   Reports improved sensation left arm and legs. Reports improved ambulation.  Pain: moderate to severe but appears related to anxiety.   Chest pain, SOB:  No      Objective:  Blood pressure 110/62, pulse 58, temperature 97.9  F (36.6  C), temperature source Oral, resp. rate 18, height 1.626 m (5' 4\"), weight 115.2 kg (253 lb 14.4 oz), SpO2 96 %.    Patient Vitals for the past 24 hrs:   BP Temp Temp src Pulse Resp SpO2 Weight   11/15/22 0700 110/62 97.9  F (36.6  C) Oral 58 18 96 % --   11/15/22 0620 -- -- -- -- -- -- 115.2 kg (253 lb 14.4 oz)   11/15/22 0442 -- -- -- -- -- 99 % --   11/15/22 0034 120/59 97.7  F (36.5  C) Oral 67 18 97 % --   11/14/22 2103 116/59 -- -- 79 -- 98 % --   11/14/22 1946 130/71 98.1  F (36.7  C) Oral 88 18 98 % --   11/14/22 1747 133/80 -- -- 84 18 97 % --   11/14/22 1553 (!) 140/67 97.7  F (36.5  C) Oral 78 16 97 % --   11/14/22 1201 127/61 98.3  F (36.8  C) Oral 73 16 96 % --       Wt Readings from Last 4 Encounters:   11/15/22 115.2 kg (253 lb 14.4 oz)       Sitting up in chair with  in place.  Motor function, sensation, and circulation grossly intact for the upper and lower extremities. Strength testing shows mild right triceps weakness compared to left.  Incisions are clean " dry and intact. Yes.   Head laceration dressing intact and dry. Covered  Post op dressing intact. Yes  Calf tenderness: Bilateral  Not.      Pertinent Labs   Lab Results: personally reviewed.     Recent Labs   Lab Test 11/13/22  0652 11/12/22  0949   HGB 11.5* 13.1   HCT 35.4 39.7   MCV 93 93    218   NA  --  137       Report completed by:  Cali De La Cruz NP  Date: 11/15/2022

## 2022-11-15 NOTE — PLAN OF CARE
Problem: Spinal Surgery  Goal: Optimal Pain Control and Function  Outcome: Progressing  Intervention: Prevent or Manage Pain     Goal Outcome Evaluation:  Pt was tearful/crying and anxious at the beginning of the shift due to sad news from daughter regarding neighbors death, daughter remained at bedside, prn ativan given, emotional and psychosocial support provided, mood seems to improve throughout shift, pt ambulating A1 with walker and gait belt, report pain with movement but subside with rest, pain managed with prn medication, integrative therapy and ice pack, neck collar remained on at all times, pt report HUBER placed on 1L oxygen nasal cannula at HS, continue to monitor.

## 2022-11-15 NOTE — DISCHARGE SUMMARY
[facility] Orthopedics Discharge Summary                                  [facility]     KATELYN RILEY [id number]   Age: 67 year old  PCP: Tutu Majano, [phone] 1955     Date of Admission:  11/11/2022  Date of Discharge::  11/15/2022 12:06 PM  Discharge Provider:  Cali De La Cruz NP    Code status:  Full Code    Admission Information:  Admission Diagnosis:  Neck pain [M54.2]  Cervical myelopathy (H) [G95.9]    Post-Operative Day: 4 Days Post-Op     Reason for admission:  The patient was admitted for the following:Procedure(s) (LRB):  CERVICAL 7 - THORACIC 1 LAMINECTOMY AND FUSION, LEFT CERVICAL 5 - CERVICAL 6 LAMINOFORAMINOTOMY WITH STEALTH NAVIGATION (Left)    Principal Problem:    Cervical myelopathy (H)  Active Problems:    Fibromyalgia    Hypothyroidism    Benign essential HTN    Atrial flutter (H)    Anxiety    Itching    Therapeutic opioid induced constipation    Meralgia paresthetica of right side    Panic attack      Allergies:  Valium [diazepam], Erythromycin, Augmentin, Bee venom, Food, Penicillins, Simvastatin, and Adhesive tape    Following the procedure noted above the patient was transferred to the post-op floor and started on:    Therapy:  physical therapy and occupational therapy  Anticoagulation Plan: scoanticoagulationlist2: Mechanical and/or ambulation    Pain Management: scopainmedication: oxycodone, dilaudid and tylenol. Patients high pain also appeared related to her high anxiety and lorazepam used PRN.   Weight bearing status: Weight bearing as tolerated     The patient was followed by Orthopedics during the inpatient treatment course:  Complications:  High anxiety and difficulty moving around causing patient to discharge to TCU.   [place]  [facility] [place].        Pertinent Labs     Lab Results: personally reviewed.   Lab Results   Component Value Date    WBC 9.7 11/13/2022    HGB 11.5 (L) 11/13/2022    HCT 35.4  "11/13/2022    MCV 93 11/13/2022     11/13/2022     No results for input(s): INR in the last 168 hours.        Discharge Information:  Condition at discharge: Stable  Discharge destination:  Discharged to short-term care facility     Medications at discharge:  Discharge Medication List as of 11/15/2022 11:54 AM      START taking these medications    Details   calcium carbonate (OS-CAIO) 500 MG tablet Take 4 tablets (2,000 mg) by mouth daily Patient has Calcium supplement, preferred brand \"Pure.\" These 300 mg pills are acceptable to the surgical team. Use 4 pills per dose as she also has calcium in her daily multivitamin., Disp-1 tablet, R-0, Local Pr int      multivitamin (ONE-DAILY) tablet Take 1 tablet by mouth daily for 1 day Patient's preferred brand is acceptable and seen by ortho [facility] inpatient. She will take one of these per day., Disp-1 tablet, R-0, Local Print      oxyCODONE (ROXICODONE) 10 MG tablet Take 1 tablet (10 mg) by mouth every 3 hours as needed for breakthrough pain or moderate to severe pain Take 1 pill for moderate pain (4-6/10) and 2 pills for severe pain (7-10/10). Maximum 6 pills per day. Alternate with Tylenol., Disp-30 tablet, R-0, L ocal Print      polyethylene glycol (MIRALAX) 17 GM/Dose powder Take 17 g by mouth daily Take while taking opioid medications and until bowels are back to normal routine. Hold for loose stools, Disp-510 g, R-0, OTC         CONTINUE these medications which have CHANGED    Details   hydrOXYzine (ATARAX) 10 MG tablet Take 1 tablet (10 mg) by mouth every 4 hours as needed for anxiety or itching, Disp-30 tablet, R-0, Local Print      LORazepam (ATIVAN) 0.5 MG tablet Take 1 tablet (0.5 mg) by mouth every 6 hours as needed for anxiety, Disp-16 tablet, R-0, Local Print         CONTINUE these medications which have NOT CHANGED    Details   acetaminophen (TYLENOL) 500 MG tablet Take 1,000 mg by mouth every 6 hours as needed for mild pain, Historical    "   carboxymethylcellulose PF (REFRESH LIQUIGEL) 1 % ophthalmic gel Place 1 drop into both eyes 4 times daily as needed, Historical      cloNIDine (CATAPRES) 0.1 MG tablet Take 0.1 mg by mouth daily as needed For sbp>180, Historical      clotrimazole-betamethasone (LOTRISONE) 1-0.05 % external cream Apply topically 2 times daily as neededHistorical      latanoprost (XALATAN) 0.005 % ophthalmic solution Place 1 drop into both eyes At Bedtime, Historical      levothyroxine (SYNTHROID/LEVOTHROID) 88 MCG tablet Take 88 mcg by mouth daily, Historical      lisinopril (ZESTRIL) 40 MG tablet Take 40 mg by mouth At Bedtime, Historical      metoprolol succinate ER (TOPROL XL) 50 MG 24 hr tablet Take 50 mg by mouth At Bedtime, Historical      omeprazole 20 MG tablet Take 20 mg by mouth daily, Historical      ORDER FOR DME CPAP    Certification: Initial    Auto PAP minimum pressure  6 cm H20  Auto PAP maximum pressure 12 cm H2O     Interface: Patient preference     Provide replacement interface, tubing and filter supplies as needed     Humidifier heated, climateline, Chin  strap, add in future at patient's request      Duration of need: 15 months  Diagnosis HUBER  Instruction to vendor: Please provide patient with mask interface of patient's choiceDisp-1 each, R-0, Fax      senna-docusate (SENOKOT-S/PERICOLACE) 8.6-50 MG tablet Take 1 tablet by mouth 2 times daily, Historical         STOP taking these medications       HYDROcodone-acetaminophen (NORCO) 5-325 MG tablet Comments:   Reason for Stopping:                       Follow-Up Care:  Patient should be seen in the office in 10-14 days by the Orthopedic Surgeon/Physician Assistant.  Call [phone] for appointment or questions.    It was my pleasure to take care of the above patient.  Cali De La Cruz NP

## 2022-11-15 NOTE — PROGRESS NOTES
Care Management Discharge Note    Discharge Date: 11/15/2022       Discharge Disposition: Skilled Nursing Facility    Discharge Services: Transportation Services ([facility] via Wheelchair @12 PM)    Discharge DME: None    Discharge Transportation: family or friend will provide    Private pay costs discussed: transportation costs discussed with patient on 11/14/22    PAS Confirmation Code:  ([id number])  Patient/family educated on Medicare website which has current facility and service quality ratings:      Education Provided on the Discharge Plan:  Per team   Persons Notified of Discharge Plans: Facility, MD, Pt and Pt's spouse, Bedside and Charge RNs, HUC  Patient/Family in Agreement with the Plan: yes    Handoff Referral Completed: Yes    Additional Information:  -8:42 AM  MARQUITA spoke with Kylee from [facility] informing her of the 12 pm transportation time.   -MARQUITA met with pt and pt's spouse to provide update with transportation time.     JUAN DANIEL Mckenzie, LICSW    11/15/2022   9:07 AM

## 2022-11-15 NOTE — PLAN OF CARE
Physical Therapy Discharge Summary    Reason for therapy discharge:    Discharged to transitional care facility.    Progress towards therapy goal(s). See goals on Care Plan in [facility] electronic health record for goal details.  Goals not met.  Barriers to achieving goals:   limited tolerance for therapy and pain.    Therapy recommendation(s):    Continued therapy is recommended.  Rationale/Recommendations:  good potential for incr. mobility as pain resolves.    Goal Outcome Evaluation:

## 2022-11-17 ENCOUNTER — TRANSITIONAL CARE UNIT VISIT (OUTPATIENT)
Dept: GERIATRICS | Facility: CLINIC | Age: 67
End: 2022-11-17
Payer: COMMERCIAL

## 2022-11-17 VITALS
RESPIRATION RATE: 18 BRPM | HEIGHT: 64 IN | OXYGEN SATURATION: 94 % | TEMPERATURE: 98.2 F | DIASTOLIC BLOOD PRESSURE: 70 MMHG | BODY MASS INDEX: 43.19 KG/M2 | SYSTOLIC BLOOD PRESSURE: 137 MMHG | HEART RATE: 61 BPM | WEIGHT: 253 LBS

## 2022-11-17 DIAGNOSIS — E66.01 MORBID OBESITY WITH BMI OF 40.0-44.9, ADULT (H): Primary | ICD-10-CM

## 2022-11-17 DIAGNOSIS — F41.0 PANIC ATTACK: ICD-10-CM

## 2022-11-17 DIAGNOSIS — I10 BENIGN ESSENTIAL HTN: ICD-10-CM

## 2022-11-17 DIAGNOSIS — M79.7 FIBROMYALGIA: ICD-10-CM

## 2022-11-17 DIAGNOSIS — F41.9 ANXIETY: ICD-10-CM

## 2022-11-17 DIAGNOSIS — I48.0 PAF (PAROXYSMAL ATRIAL FIBRILLATION) (H): ICD-10-CM

## 2022-11-17 DIAGNOSIS — L29.9 ITCHING: ICD-10-CM

## 2022-11-17 DIAGNOSIS — G95.9 CERVICAL MYELOPATHY (H): ICD-10-CM

## 2022-11-17 DIAGNOSIS — E13.9 SECONDARY DIABETES MELLITUS (H): ICD-10-CM

## 2022-11-17 DIAGNOSIS — E11.9 TYPE 2 DIABETES, HBA1C GOAL < 7% (H): ICD-10-CM

## 2022-11-17 PROCEDURE — 99306 1ST NF CARE HIGH MDM 50: CPT | Performed by: FAMILY MEDICINE

## 2022-11-17 NOTE — PROGRESS NOTES
[facility] GERIATRIC SERVICES       Patient Mary Jo Vallejo  MRN: [id number]        Reason for Visit     Chief Complaint   Patient presents with     Hospital F/U       Code Status     CPR/Full code     Assessment     :   S/P CERVICAL 7 - THORACIC 1 LAMINECTOMY AND FUSION, LEFT CERVICAL 5 - CERVICAL 6 LAMINOFORAMINOTOMY WITH STEALTH NAVIGATION (Left)  HTN  FIBROMYALGIA  ATRIAL FLUTTER  Severe anxiety patient having panic attacks in the hospital  Generalized weakness    Plan     Pt is admitted to TCU for strengthening and rehab.  Patient examined in the presence of her daughter who supplemented her history   She underwent above-mentioned procedure and has now been discharged to the TCU.  Encourage compliance with her bracing.  Pain management reviewed in detail with the patient and her daughter.  She has been given max of 10 to 20 mg of oxycodone every 3 hours with a total of 6 pills allowed in 24-hour..  Staff is reporting she is maxing out within first few hours leaving her bed ovarian pain pills.  She is requesting a pain pill every 3 hours.  I suspect anxiety may be contributing to it and patient agreed.  I discussed pain management with her daughter in her presence.  Discontinue Tylenol hydroxyzine as well as oxycodone orders.  Tylenol 1 g 4 times daily to be scheduled for her.  Hydroxyzine 10 mg 4 times daily scheduled  Also has diazepam which she can ask for as needed for anxiety as well as muscle spasms.  Discontinue oxycodone order.  Start her on oxycodone 10 mg 1 p.o. every 4 hours as needed.  We will not schedule any narcotic as yet.  Reviewed anxiety management at baseline she has anxiety and panic attacks and also uses a device attached to her hands for management.  Nonpharmacological measures reviewed.  She can try aromatherapy and massage and can discuss it with therapy.  She did not tolerate upper weighted blanket.  She has claustrophobia.  She also can try some pet therapy her daughter has a dog.  We  talked about constipation and medications she is on if she does not have response to all her medications she can go up on the senna.  We talked also about diabetes management which has been diet controlled advised her to switch to a mechanical soft diet if possible but try to eat healthy as much as possible in the TCU she is finding the challenging.  She and daughter counseled for her optimum health to try incentive spirometry and start ambulating and not lay in the bed at all.  She is having difficulty using a recliner advised her to use a wheelchair  Recheck labs  Continue with PT/OT  Time spent is 45 minutes with more than 30-minute spent face-to-face reviewing pain management anxiety management as well as post surgery rehab and medication concerns with the patient.  She will talk to the dietitian and occupational therapist regarding her request for rehab with massage as well as aromatherapy    History     Patient is a very pleasant 67 year old female who is admitted to TCU  Patient has underlying history of cervical myelopathy and underwent above-mentioned procedure in the hospital she tolerated it well.  Course complicated by severe anxiety with significant panic attacks noted.  Pain management optimized she has underlying history of chronic pain and has a history of fibromyalgia.  nursing and pt frustrated with oxy orders  She has hypoxia but was weaned off oxygen and discharged to the TCU  Has DM and is managed with diet   Has chronic anxiety    Past Medical & Surgical History     PAST MEDICAL HISTORY:   Past Medical History:   Diagnosis Date     Acute posthemorrhagic anemia      Body mass index 40.0-44.9, adult (H)      Chronic atrial fibrillation (H)      Diaphragmatic hernia without mention of obstruction or gangrene     Hiatal hernia     DM (diabetes mellitus) (H) 02/01/2011    hospitalized     DM (diabetes mellitus) (H)     oral control     Fibromyalgia      Hyperlipidemia LDL goal <100 03/11/2011     diet controlled     Hypertension      Hypothyroidism 03/11/2011     Intermittent asthma 01/27/2009    Related to bronchitis     Irregular heart beat      Irritable bowel syndrome     Irritable bowel     Other chronic pain      Primary localized osteoarthrosis, lower leg 10/27/2013    Hospitalized     Reflux esophagitis      Sleep apnea      Thyroglossal cyst     query     Unspecified hypothyroidism     Hypothyroidism      PAST SURGICAL HISTORY:   has a past surgical history that includes VAG HYST,RMV TUBE/OVARY; laparoscopic cholecystoenterostomy (1990's); APPENDECTOMY; NONSPECIFIC PROCEDURE (1/19/12); TOTAL KNEE ARTHROPLASTY (10/24/13); DRAIN/INJECT LARGE JOINT/BURSA (10/24/13); and Optical Tracking System Fusion Posterior Cervical Two Levels (Left, 11/11/2022).      Past Social History     Reviewed,  reports that she has never smoked. She has never used smokeless tobacco. She reports that she does not currently use alcohol. She reports that she does not use drugs.    Family History     Reviewed, and family history includes Breast Cancer in her maternal aunt and paternal grandmother; Breast Cancer (age of onset: 33) in her sister; Cancer in her maternal grandfather and paternal aunt; Cancer - colorectal (age of onset: 60) in her father; Depression in her mother; EYE* in her father; Hypertension in her mother; Neurologic Disorder in her father.    Medication List   Post Discharge Medication Reconciliation Status: Post Discharge Medication Reconciliation Status: discharge medications reconciled, continue medications without change.  Current Outpatient Medications   Medication     acetaminophen (TYLENOL) 500 MG tablet     calcium carbonate (OS-CAIO) 500 MG tablet     carboxymethylcellulose PF (REFRESH LIQUIGEL) 1 % ophthalmic gel     cloNIDine (CATAPRES) 0.1 MG tablet     clotrimazole-betamethasone (LOTRISONE) 1-0.05 % external cream     hydrOXYzine (ATARAX) 10 MG tablet     latanoprost (XALATAN) 0.005 % ophthalmic  solution     levothyroxine (SYNTHROID/LEVOTHROID) 88 MCG tablet     lisinopril (ZESTRIL) 40 MG tablet     LORazepam (ATIVAN) 0.5 MG tablet     metoprolol succinate ER (TOPROL XL) 50 MG 24 hr tablet     omeprazole 20 MG tablet     ORDER FOR DME     oxyCODONE (ROXICODONE) 10 MG tablet     polyethylene glycol (MIRALAX) 17 GM/Dose powder     senna-docusate (SENOKOT-S/PERICOLACE) 8.6-50 MG tablet     No current facility-administered medications for this visit.          Allergies     Allergies   Allergen Reactions     Valium [Diazepam] Anaphylaxis     Gets very obstenant     Erythromycin Nausea and Vomiting     Augmentin Nausea and Vomiting     Noted in 6/6/09 ER     Bee Venom Swelling     Food      Multiple food allergies, ask patient     Penicillins Itching     Simvastatin Muscle Pain (Myalgia)     Adhesive Tape Rash     silicvone       Review of Systems   A comprehensive review of 14 systems was done. Pertinent findings noted here and in history of present illness. All the rest negative.  Constitutional: Negative.  Negative for fever, chills, she has  activity change, appetite change and fatigue.   Patient is very mindful of her diet.  She reports that she eats healthy and has been diet controlling her diabetes.  She is having difficulty swallowing which she attributes to the brace  HENT: Negative for congestion and facial swelling.    Eyes: Negative for photophobia, redness and visual disturbance.   Respiratory: Negative for cough and chest tightness.    Cardiovascular: Negative for chest pain, palpitations and leg swelling.   Gastrointestinal: Negative for nausea, diarrhea, constipation, blood in stool and abdominal distention.  Has opioid-induced constipation and reports that she is on MiraLAX but senna that may not work she is also taking prune juice  Genitourinary: Negative.    Musculoskeletal: No issues and not happy with her current pain regimen nursing reports that she is requesting a pain pill  "around-the-clock  Skin: Negative.  Reports some itching from opioid use  Neurological: Negative for dizziness, tremors, syncope, weakness, light-headedness and headaches.   Hematological: Does not bruise/bleed easily.   Psychiatric/Behavioral:reports chronic pain and anxiety   Has panic attacks        Physical Exam   /70   Pulse 61   Temp 98.2  F (36.8  C)   Resp 18   Ht 1.626 m (5' 4\")   Wt 114.8 kg (253 lb)   SpO2 94%   BMI 43.43 kg/m     Has obesity  Constitutional: Oriented to person, place, and time and appears well-developed.   HEENT:  Normocephalic and atraumatic.  Eyes: Conjunctivae and EOM are normal. Pupils are equal, round, and reactive to light. No discharge.  No scleral icterus. Nose normal. Mouth/Throat: Oropharynx is clear and moist. No oropharyngeal exudate.    NECK: Normal range of motion. Neck supple. No JVD present. No tracheal deviation present. No thyromegaly present.   CTLSO noted  CARDIOVASCULAR: Normal rate, regular rhythm and intact distal pulses.  Exam reveals no gallop and no friction rub.  Systolic murmur present.  PULMONARY: Effort normal and breath sounds normal. No respiratory distress.No Wheezing or rales.  ABDOMEN: Soft. Bowel sounds are normal. No distension and no mass.  There is no tenderness. There is no rebound and no guarding. No HSM.  MUSCULOSKELETAL: Normal range of motion. No edema and no tenderness. Mild kyphosis, no tenderness.  Wrist brace noted b/l  LYMPH NODES: Has no cervical, supraclavicular, axillary and groin adenopathy.   NEUROLOGICAL: Alert and oriented to person, place, and time. No cranial nerve deficit.  Normal muscle tone. Coordination normal.   GENITOURINARY: Deferred exam.  SKIN: Skin is warm and dry. No rash noted. No erythema. No pallor.   EXTREMITIES: No cyanosis, no clubbing, no edema. No Deformity.  PSYCHIATRIC: Normal mood, affect and behavior.      Lab Results     Recent Results (from the past 240 hour(s))   COVID-19 VIRUS (CORONAVIRUS) " BY PCR (EXTERNAL RESULT)    Collection Time: 11/08/22 11:16 AM   Result Value Ref Range    COVID-19 Virus by PCR (External Result) Negative Negative   Glucose by meter    Collection Time: 11/11/22 11:29 AM   Result Value Ref Range    GLUCOSE BY METER POCT 118 (H) 70 - 99 mg/dL   Glucose by meter    Collection Time: 11/11/22  3:47 PM   Result Value Ref Range    GLUCOSE BY METER POCT 149 (H) 70 - 99 mg/dL   Glucose by meter    Collection Time: 11/11/22  5:50 PM   Result Value Ref Range    GLUCOSE BY METER POCT 167 (H) 70 - 99 mg/dL   CBC with platelets    Collection Time: 11/12/22  9:49 AM   Result Value Ref Range    WBC Count 12.5 (H) 4.0 - 11.0 10e3/uL    RBC Count 4.26 3.80 - 5.20 10e6/uL    Hemoglobin 13.1 11.7 - 15.7 g/dL    Hematocrit 39.7 35.0 - 47.0 %    MCV 93 78 - 100 fL    MCH 30.8 26.5 - 33.0 pg    MCHC 33.0 31.5 - 36.5 g/dL    RDW 13.4 10.0 - 15.0 %    Platelet Count 218 150 - 450 10e3/uL   Basic metabolic panel    Collection Time: 11/12/22  9:49 AM   Result Value Ref Range    Sodium 137 136 - 145 mmol/L    Potassium 4.4 3.5 - 5.0 mmol/L    Chloride 101 98 - 107 mmol/L    Carbon Dioxide (CO2) 25 22 - 31 mmol/L    Anion Gap 11 5 - 18 mmol/L    Urea Nitrogen 16 8 - 22 mg/dL    Creatinine 0.77 0.60 - 1.10 mg/dL    Calcium 9.7 8.5 - 10.5 mg/dL    Glucose 140 (H) 70 - 125 mg/dL    GFR Estimate 84 >60 mL/min/1.73m2   CBC with platelets    Collection Time: 11/13/22  6:52 AM   Result Value Ref Range    WBC Count 9.7 4.0 - 11.0 10e3/uL    RBC Count 3.80 3.80 - 5.20 10e6/uL    Hemoglobin 11.5 (L) 11.7 - 15.7 g/dL    Hematocrit 35.4 35.0 - 47.0 %    MCV 93 78 - 100 fL    MCH 30.3 26.5 - 33.0 pg    MCHC 32.5 31.5 - 36.5 g/dL    RDW 13.8 10.0 - 15.0 %    Platelet Count 182 150 - 450 10e3/uL   Extra Red Top Tube    Collection Time: 11/13/22  6:52 AM   Result Value Ref Range    Hold Specimen JI    Asymptomatic COVID-19 Virus (Coronavirus) by PCR Nasopharyngeal    Collection Time: 11/15/22 11:52 AM    Specimen:  Nasopharyngeal; Swab   Result Value Ref Range    SARS CoV2 PCR Negative Negative             Imaging Results     XR Surgery RIOS  Fluoro G/T 5 Min    Result Date: 11/11/2022  This exam was marked as non-reportable because it will not be read by a radiologist or a [place] non-radiologist provider.     CT Cervical Spine w/o Contrast    Result Date: 11/11/2022  EXAM: CT CERVICAL SPINE W/O CONTRAST LOCATION: [facility] DATE/TIME: 11/11/2022 6:39 PM INDICATION: Neck pain; hx Spine surgery; No suspected hardware failure; None of the following: New acute radiculopathy, weakness, or worsening neck pain; No suspected cervical disc disease COMPARISON: None. TECHNIQUE: Routine CT Cervical Spine without IV contrast. Multiplanar reformats. Dose reduction techniques were used. FINDINGS: VERTEBRA: Mild anterior spondylolisthesis at C3-C4 with otherwise normal alignment. Normal vertebral body heights. Moderate to severe disc space narrowing C4-T1 with endplate sclerosis and marginal osteophytes. Facet arthropathy preferentially within the  upper cervical spine on the right, with facet ankylosis at C2-C3. Posterior instrumented spinal fusion at C7-T1, with C7 laminectomy. No hardware complication. CANAL/FORAMINA: Moderate to severe right foraminal stenosis at C3-C4. Severe left and moderate right foraminal stenoses at C4-C5. Moderate to severe spinal canal stenosis at C5-C6 with severe bilateral foraminal stenoses. Moderate spinal canal stenosis at C6-C7 with severe right and moderate left foraminal stenoses. The C7-T1 level is obscured by artifact from the fusion hardware. PARASPINAL: Immediate postoperative changes including a surgical drain at the operative level and scattered foci of air within the posterior soft tissues.     IMPRESSION: 1.  Immediate postoperative change relating to posterior instrumented spinal fusion at C7-T1 with C7 laminectomy. No evidence for hardware complication. 2.   Spondylosis with multilevel spinal canal and foraminal stenoses as detailed.    XR Cervical Spine Port 2/3 Views    Result Date: 11/14/2022  EXAM: XR CERVICAL SPINE PORT 2/3 VIEWS LOCATION: [facility] DATE/TIME: 11/14/2022 9:59 AM INDICATION: s p fusion COMPARISON: 11/11/2022 cervical spine CT. TECHNIQUE: CR Cervical Spine.     IMPRESSION: Images taken upright and in cervical collar. Cervical spine visualized from skull base through C7. Posterior spinal instrumented fusion of C7-T1 without evident hardware complication. No finding for hardware loosening. Straightening of usual cervical lordosis with minimal anterolisthesis of C3 on C4. Vertebral body heights are maintained. Moderate atlantodental degenerative change with mild C2-C3 degenerative disc disease. Moderate C4-C5 and severe C5-C6 degenerative disc disease. Additional  moderate C6-C7 degenerative disc disease with multilevel moderate facet arthropathy. No prevertebral soft tissue swelling.         Electronically signed by    Marquita Beckman MD

## 2022-11-17 NOTE — LETTER
11/17/2022        RE: Mary Jo Vallejo   [address]  [facility] [address]        [facility] GER[facility]ATRIC [facility]       Patient Mary Jo Vallejo  MRN: [id number]        Reason for Visit     Chief Complaint   Patient presents with     Hospital F/U       Code Status     CPR/Full code     Assessment     :   S/P CERVICAL 7 - THORACIC 1 LAMINECTOMY AND FUSION, LEFT CERVICAL 5 - CERVICAL 6 LAMINOFORAMINOTOMY WITH STEALTH NAVIGATION (Left)  HTN  FIBROMYALGIA  ATRIAL FLUTTER  Severe anxiety patient having panic attacks in the hospital  Generalized weakness    Plan     Pt is admitted to TCU for strengthening and rehab.  Patient examined in the presence of her daughter who supplemented her history   She underwent above-mentioned procedure and has now been discharged to the TCU.  Encourage compliance with her bracing.  Pain management reviewed in detail with the patient and her daughter.  She has been given max of 10 to 20 mg of oxycodone every 3 hours with a total of 6 pills allowed in 24-hour..  Staff is reporting she is maxing out within first few hours leaving her bed ovarian pain pills.  She is requesting a pain pill every 3 hours.  I suspect anxiety may be contributing to it and patient agreed.  I discussed pain management with her daughter in her presence.  Discontinue Tylenol hydroxyzine as well as oxycodone orders.  Tylenol 1 g 4 times daily to be scheduled for her.  Hydroxyzine 10 mg 4 times daily scheduled  Also has diazepam which she can ask for as needed for anxiety as well as muscle spasms.  Discontinue oxycodone order.  Start her on oxycodone 10 mg 1 p.o. every 4 hours as needed.  We will not schedule any narcotic as yet.  Reviewed anxiety management at baseline she has anxiety and panic attacks and also uses a device attached to her hands for management.  Nonpharmacological measures reviewed.  She can try aromatherapy and massage and can discuss it with therapy.  She did not tolerate upper weighted  blanket.  She has claustrophobia.  She also can try some pet therapy her daughter has a dog.  We talked about constipation and medications she is on if she does not have response to all her medications she can go up on the senna.  We talked also about diabetes management which has been diet controlled advised her to switch to a mechanical soft diet if possible but try to eat healthy as much as possible in the TCU she is finding the challenging.  She and daughter counseled for her optimum health to try incentive spirometry and start ambulating and not lay in the bed at all.  She is having difficulty using a recliner advised her to use a wheelchair  Recheck labs  Continue with PT/OT  Time spent is 45 minutes with more than 30-minute spent face-to-face reviewing pain management anxiety management as well as post surgery rehab and medication concerns with the patient.  She will talk to the dietitian and occupational therapist regarding her request for rehab with massage as well as aromatherapy    History     Patient is a very pleasant 67 year old female who is admitted to TCU  Patient has underlying history of cervical myelopathy and underwent above-mentioned procedure in the hospital she tolerated it well.  Course complicated by severe anxiety with significant panic attacks noted.  Pain management optimized she has underlying history of chronic pain and has a history of fibromyalgia.  nursing and pt frustrated with oxy orders  She has hypoxia but was weaned off oxygen and discharged to the TCU  Has DM and is managed with diet   Has chronic anxiety    Past Medical & Surgical History     PAST MEDICAL HISTORY:   Past Medical History:   Diagnosis Date     Acute posthemorrhagic anemia      Body mass index 40.0-44.9, adult (H)      Chronic atrial fibrillation (H)      Diaphragmatic hernia without mention of obstruction or gangrene     Hiatal hernia     DM (diabetes mellitus) (H) 02/01/2011    hospitalized     DM (diabetes  mellitus) (H)     oral control     Fibromyalgia      Hyperlipidemia LDL goal <100 03/11/2011    diet controlled     Hypertension      Hypothyroidism 03/11/2011     Intermittent asthma 01/27/2009    Related to bronchitis     Irregular heart beat      Irritable bowel syndrome     Irritable bowel     Other chronic pain      Primary localized osteoarthrosis, lower leg 10/27/2013    Hospitalized     Reflux esophagitis      Sleep apnea      Thyroglossal cyst     query     Unspecified hypothyroidism     Hypothyroidism      PAST SURGICAL HISTORY:   has a past surgical history that includes VAG HYST,RMV TUBE/OVARY; laparoscopic cholecystoenterostomy (1990's); APPENDECTOMY; NONSPECIFIC PROCEDURE (1/19/12); TOTAL KNEE ARTHROPLASTY (10/24/13); DRAIN/INJECT LARGE JOINT/BURSA (10/24/13); and Optical Tracking System Fusion Posterior Cervical Two Levels (Left, 11/11/2022).      Past Social History     Reviewed,  reports that she has never smoked. She has never used smokeless tobacco. She reports that she does not currently use alcohol. She reports that she does not use drugs.    Family History     Reviewed, and family history includes Breast Cancer in her maternal aunt and paternal grandmother; Breast Cancer (age of onset: 33) in her sister; Cancer in her maternal grandfather and paternal aunt; Cancer - colorectal (age of onset: 60) in her father; Depression in her mother; EYE* in her father; Hypertension in her mother; Neurologic Disorder in her father.    Medication List   Post Discharge Medication Reconciliation Status: Post Discharge Medication Reconciliation Status: discharge medications reconciled, continue medications without change.  Current Outpatient Medications   Medication     acetaminophen (TYLENOL) 500 MG tablet     calcium carbonate (OS-CAIO) 500 MG tablet     carboxymethylcellulose PF (REFRESH LIQUIGEL) 1 % ophthalmic gel     cloNIDine (CATAPRES) 0.1 MG tablet     clotrimazole-betamethasone (LOTRISONE) 1-0.05 %  external cream     hydrOXYzine (ATARAX) 10 MG tablet     latanoprost (XALATAN) 0.005 % ophthalmic solution     levothyroxine (SYNTHROID/LEVOTHROID) 88 MCG tablet     lisinopril (ZESTRIL) 40 MG tablet     LORazepam (ATIVAN) 0.5 MG tablet     metoprolol succinate ER (TOPROL XL) 50 MG 24 hr tablet     omeprazole 20 MG tablet     ORDER FOR DME     oxyCODONE (ROXICODONE) 10 MG tablet     polyethylene glycol (MIRALAX) 17 GM/Dose powder     senna-docusate (SENOKOT-S/PERICOLACE) 8.6-50 MG tablet     No current facility-administered medications for this visit.          Allergies     Allergies   Allergen Reactions     Valium [Diazepam] Anaphylaxis     Gets very obstenant     Erythromycin Nausea and Vomiting     Augmentin Nausea and Vomiting     Noted in 6/6/09 ER     Bee Venom Swelling     Food      Multiple food allergies, ask patient     Penicillins Itching     Simvastatin Muscle Pain (Myalgia)     Adhesive Tape Rash     silicvone       Review of Systems   A comprehensive review of 14 systems was done. Pertinent findings noted here and in history of present illness. All the rest negative.  Constitutional: Negative.  Negative for fever, chills, she has  activity change, appetite change and fatigue.   Patient is very mindful of her diet.  She reports that she eats healthy and has been diet controlling her diabetes.  She is having difficulty swallowing which she attributes to the brace  HENT: Negative for congestion and facial swelling.    Eyes: Negative for photophobia, redness and visual disturbance.   Respiratory: Negative for cough and chest tightness.    Cardiovascular: Negative for chest pain, palpitations and leg swelling.   Gastrointestinal: Negative for nausea, diarrhea, constipation, blood in stool and abdominal distention.  Has opioid-induced constipation and reports that she is on MiraLAX but senna that may not work she is also taking prune juice  Genitourinary: Negative.    Musculoskeletal: No issues and not happy  "with her current pain regimen nursing reports that she is requesting a pain pill around-the-clock  Skin: Negative.  Reports some itching from opioid use  Neurological: Negative for dizziness, tremors, syncope, weakness, light-headedness and headaches.   Hematological: Does not bruise/bleed easily.   Psychiatric/Behavioral:reports chronic pain and anxiety   Has panic attacks        Physical Exam   /70   Pulse 61   Temp 98.2  F (36.8  C)   Resp 18   Ht 1.626 m (5' 4\")   Wt 114.8 kg (253 lb)   SpO2 94%   BMI 43.43 kg/m     Has obesity  Constitutional: Oriented to person, place, and time and appears well-developed.   HEENT:  Normocephalic and atraumatic.  Eyes: Conjunctivae and EOM are normal. Pupils are equal, round, and reactive to light. No discharge.  No scleral icterus. Nose normal. Mouth/Throat: Oropharynx is clear and moist. No oropharyngeal exudate.    NECK: Normal range of motion. Neck supple. No JVD present. No tracheal deviation present. No thyromegaly present.   CTLSO noted  CARDIOVASCULAR: Normal rate, regular rhythm and intact distal pulses.  Exam reveals no gallop and no friction rub.  Systolic murmur present.  PULMONARY: Effort normal and breath sounds normal. No respiratory distress.No Wheezing or rales.  ABDOMEN: Soft. Bowel sounds are normal. No distension and no mass.  There is no tenderness. There is no rebound and no guarding. No HSM.  MUSCULOSKELETAL: Normal range of motion. No edema and no tenderness. Mild kyphosis, no tenderness.  Wrist brace noted b/l  LYMPH NODES: Has no cervical, supraclavicular, axillary and groin adenopathy.   NEUROLOGICAL: Alert and oriented to person, place, and time. No cranial nerve deficit.  Normal muscle tone. Coordination normal.   GENITOURINARY: Deferred exam.  SKIN: Skin is warm and dry. No rash noted. No erythema. No pallor.   EXTREMITIES: No cyanosis, no clubbing, no edema. No Deformity.  PSYCHIATRIC: Normal mood, affect and behavior.      Lab " Results     Recent Results (from the past 240 hour(s))   COVID-19 VIRUS (CORONAVIRUS) BY PCR (EXTERNAL RESULT)    Collection Time: 11/08/22 11:16 AM   Result Value Ref Range    COVID-19 Virus by PCR (External Result) Negative Negative   Glucose by meter    Collection Time: 11/11/22 11:29 AM   Result Value Ref Range    GLUCOSE BY METER POCT 118 (H) 70 - 99 mg/dL   Glucose by meter    Collection Time: 11/11/22  3:47 PM   Result Value Ref Range    GLUCOSE BY METER POCT 149 (H) 70 - 99 mg/dL   Glucose by meter    Collection Time: 11/11/22  5:50 PM   Result Value Ref Range    GLUCOSE BY METER POCT 167 (H) 70 - 99 mg/dL   CBC with platelets    Collection Time: 11/12/22  9:49 AM   Result Value Ref Range    WBC Count 12.5 (H) 4.0 - 11.0 10e3/uL    RBC Count 4.26 3.80 - 5.20 10e6/uL    Hemoglobin 13.1 11.7 - 15.7 g/dL    Hematocrit 39.7 35.0 - 47.0 %    MCV 93 78 - 100 fL    MCH 30.8 26.5 - 33.0 pg    MCHC 33.0 31.5 - 36.5 g/dL    RDW 13.4 10.0 - 15.0 %    Platelet Count 218 150 - 450 10e3/uL   Basic metabolic panel    Collection Time: 11/12/22  9:49 AM   Result Value Ref Range    Sodium 137 136 - 145 mmol/L    Potassium 4.4 3.5 - 5.0 mmol/L    Chloride 101 98 - 107 mmol/L    Carbon Dioxide (CO2) 25 22 - 31 mmol/L    Anion Gap 11 5 - 18 mmol/L    Urea Nitrogen 16 8 - 22 mg/dL    Creatinine 0.77 0.60 - 1.10 mg/dL    Calcium 9.7 8.5 - 10.5 mg/dL    Glucose 140 (H) 70 - 125 mg/dL    GFR Estimate 84 >60 mL/min/1.73m2   CBC with platelets    Collection Time: 11/13/22  6:52 AM   Result Value Ref Range    WBC Count 9.7 4.0 - 11.0 10e3/uL    RBC Count 3.80 3.80 - 5.20 10e6/uL    Hemoglobin 11.5 (L) 11.7 - 15.7 g/dL    Hematocrit 35.4 35.0 - 47.0 %    MCV 93 78 - 100 fL    MCH 30.3 26.5 - 33.0 pg    MCHC 32.5 31.5 - 36.5 g/dL    RDW 13.8 10.0 - 15.0 %    Platelet Count 182 150 - 450 10e3/uL   Extra Red Top Tube    Collection Time: 11/13/22  6:52 AM   Result Value Ref Range    Hold Specimen JIC    Asymptomatic COVID-19 Virus  (Coronavirus) by PCR Nasopharyngeal    Collection Time: 11/15/22 11:52 AM    Specimen: Nasopharyngeal; Swab   Result Value Ref Range    SARS CoV2 PCR Negative Negative             Imaging Results     XR Surgery RIOS  Fluoro G/T 5 Min    Result Date: 11/11/2022  This exam was marked as non-reportable because it will not be read by a radiologist or a [place] non-radiologist provider.     CT Cervical Spine w/o Contrast    Result Date: 11/11/2022  EXAM: CT CERVICAL SPINE W/O CONTRAST LOCATION: [facility] DATE/TIME: 11/11/2022 6:39 PM INDICATION: Neck pain; hx Spine surgery; No suspected hardware failure; None of the following: New acute radiculopathy, weakness, or worsening neck pain; No suspected cervical disc disease COMPARISON: None. TECHNIQUE: Routine CT Cervical Spine without IV contrast. Multiplanar reformats. Dose reduction techniques were used. FINDINGS: VERTEBRA: Mild anterior spondylolisthesis at C3-C4 with otherwise normal alignment. Normal vertebral body heights. Moderate to severe disc space narrowing C4-T1 with endplate sclerosis and marginal osteophytes. Facet arthropathy preferentially within the  upper cervical spine on the right, with facet ankylosis at C2-C3. Posterior instrumented spinal fusion at C7-T1, with C7 laminectomy. No hardware complication. CANAL/FORAMINA: Moderate to severe right foraminal stenosis at C3-C4. Severe left and moderate right foraminal stenoses at C4-C5. Moderate to severe spinal canal stenosis at C5-C6 with severe bilateral foraminal stenoses. Moderate spinal canal stenosis at C6-C7 with severe right and moderate left foraminal stenoses. The C7-T1 level is obscured by artifact from the fusion hardware. PARASPINAL: Immediate postoperative changes including a surgical drain at the operative level and scattered foci of air within the posterior soft tissues.     IMPRESSION: 1.  Immediate postoperative change relating to posterior instrumented spinal  fusion at C7-T1 with C7 laminectomy. No evidence for hardware complication. 2.  Spondylosis with multilevel spinal canal and foraminal stenoses as detailed.    XR Cervical Spine Port 2/3 Views    Result Date: 11/14/2022  EXAM: XR CERVICAL SPINE PORT 2/3 VIEWS LOCATION: [facility] DATE/TIME: 11/14/2022 9:59 AM INDICATION: s p fusion COMPARISON: 11/11/2022 cervical spine CT. TECHNIQUE: CR Cervical Spine.     IMPRESSION: Images taken upright and in cervical collar. Cervical spine visualized from skull base through C7. Posterior spinal instrumented fusion of C7-T1 without evident hardware complication. No finding for hardware loosening. Straightening of usual cervical lordosis with minimal anterolisthesis of C3 on C4. Vertebral body heights are maintained. Moderate atlantodental degenerative change with mild C2-C3 degenerative disc disease. Moderate C4-C5 and severe C5-C6 degenerative disc disease. Additional  moderate C6-C7 degenerative disc disease with multilevel moderate facet arthropathy. No prevertebral soft tissue swelling.         Electronically signed by    Marquita Beckman MD                             Sincerely,        ARON Subramanian

## 2022-11-20 ENCOUNTER — HEALTH MAINTENANCE LETTER (OUTPATIENT)
Age: 67
End: 2022-11-20

## 2022-11-20 RX ORDER — VITS A,C,E/LUTEIN/MINERALS 300MCG-200
1 TABLET ORAL DAILY
COMMUNITY

## 2022-11-20 RX ORDER — LORAZEPAM 0.5 MG/1
0.5 TABLET ORAL EVERY 6 HOURS PRN
COMMUNITY

## 2022-11-21 ENCOUNTER — TRANSITIONAL CARE UNIT VISIT (OUTPATIENT)
Dept: GERIATRICS | Facility: CLINIC | Age: 67
End: 2022-11-21
Payer: COMMERCIAL

## 2022-11-21 VITALS
SYSTOLIC BLOOD PRESSURE: 148 MMHG | OXYGEN SATURATION: 97 % | HEART RATE: 60 BPM | DIASTOLIC BLOOD PRESSURE: 81 MMHG | HEIGHT: 64 IN | RESPIRATION RATE: 20 BRPM | BODY MASS INDEX: 43.19 KG/M2 | WEIGHT: 253 LBS | TEMPERATURE: 96.1 F

## 2022-11-21 DIAGNOSIS — E11.9 TYPE 2 DIABETES, HBA1C GOAL < 7% (H): ICD-10-CM

## 2022-11-21 DIAGNOSIS — R52 PAIN MANAGEMENT: ICD-10-CM

## 2022-11-21 DIAGNOSIS — G95.9 CERVICAL MYELOPATHY (H): Primary | ICD-10-CM

## 2022-11-21 DIAGNOSIS — R53.81 PHYSICAL DECONDITIONING: ICD-10-CM

## 2022-11-21 PROCEDURE — 99310 SBSQ NF CARE HIGH MDM 45: CPT | Performed by: NURSE PRACTITIONER

## 2022-11-21 NOTE — LETTER
11/21/2022        RE: Mary Jo Vallejo   [address]  [facility] [address]        [facility] GERIATRIC SERVICES  Chief Complaint   Patient presents with     [facility] F/U     [facility] Record Number:  [id number]  Place of Service where encounter took place:  [facility] ([facility]) [[id number]]  Code Status:  Unknown     HISTORY:      HPI:  Mary Jo Vallejo  is 67 year old (1955) undergoing physical and occupational therapy.  She is with past medical history hypothyroidism, hyperlipidemia, diabetes mellitus type 2, chronic atrial fibrillation, hypertension, chronic pain, reflux who underwent CERVICAL 7 - THORACIC 1 LAMINECTOMY AND FUSION, LEFT CERVICAL 5 - CERVICAL 6 LAMINOFORAMINOTOMY WITH STEALTH NAVIGATION (Left)    Today she is seen to review vital signs, labs, routine visit and to establish care.  She denied chest pain shortness of breath constipation or diarrhea.  She denied any numbness or tingling.  She rated her pain 5 out of 10 and it goes up to 8 out of 10 but then comes back down after pain medications.  She is a type II diabetic does not check fingersticks and last A1c on 11/3/2022 was 6.2.  A face-to-face was also done for PRN Ativan.  Other labs reviewed hemoglobin 11.5 ,BMP within normal limits, TSH on 10/17/2022 was 2.22.    ALLERGIES:Valium [diazepam], Erythromycin, Augmentin, Bee venom, Food, Penicillins, Simvastatin, and Adhesive tape    PAST MEDICAL HISTORY:   Past Medical History:   Diagnosis Date     Acute posthemorrhagic anemia      Body mass index 40.0-44.9, adult (H)      Chronic atrial fibrillation (H)      Diaphragmatic hernia without mention of obstruction or gangrene     Hiatal hernia     DM (diabetes mellitus) (H) 02/01/2011    hospitalized     DM (diabetes mellitus) (H)     oral control     Fibromyalgia      Hyperlipidemia LDL goal <100 03/11/2011    diet controlled     Hypertension      Hypothyroidism 03/11/2011     Intermittent asthma 01/27/2009    Related to  bronchitis     Irregular heart beat      Irritable bowel syndrome     Irritable bowel     Other chronic pain      Primary localized osteoarthrosis, lower leg 10/27/2013    Hospitalized     Reflux esophagitis      Sleep apnea      Thyroglossal cyst     query     Unspecified hypothyroidism     Hypothyroidism       PAST SURGICAL HISTORY:   has a past surgical history that includes VAG HYST,RMV TUBE/OVARY; laparoscopic cholecystoenterostomy (1990's); APPENDECTOMY; NONSPECIFIC PROCEDURE (1/19/12); TOTAL KNEE ARTHROPLASTY (10/24/13); DRAIN/INJECT LARGE JOINT/BURSA (10/24/13); and Optical Tracking System Fusion Posterior Cervical Two Levels (Left, 11/11/2022).    FAMILY HISTORY: family history includes Breast Cancer in her maternal aunt and paternal grandmother; Breast Cancer (age of onset: 33) in her sister; Cancer in her maternal grandfather and paternal aunt; Cancer - colorectal (age of onset: 60) in her father; Depression in her mother; EYE* in her father; Hypertension in her mother; Neurologic Disorder in her father.    SOCIAL HISTORY:  reports that she has never smoked. She has never used smokeless tobacco. She reports that she does not currently use alcohol. She reports that she does not use drugs.    ROS:  Constitutional: Negative for activity change, appetite change, fatigue and fever.   HENT: Negative for congestion.    Respiratory: Negative for cough, shortness of breath and wheezing.    Cardiovascular: Negative for chest pain and leg swelling.   Gastrointestinal: Negative for abdominal distention, abdominal pain, constipation, diarrhea and nausea.   Genitourinary: Negative for dysuria.   Musculoskeletal: Negative for arthralgia. Negative for back pain.   Skin: Negative for color change and positive for surgical wound.   Neurological: Negative for dizziness.   Psychiatric/Behavioral: Negative for agitation, behavioral problems and confusion.     Physical Exam:  Constitutional:       Appearance: Patient is  "well-developed.   HENT:      Head: Normocephalic.   Eyes:      Conjunctiva/sclera: Conjunctivae normal.   Neck:      Musculoskeletal: Normal range of motion.   Cardiovascular:      Rate and Rhythm: Normal rate and regular rhythm.      Heart sounds: Normal heart sounds. No murmur.   Pulmonary:      Effort: No respiratory distress.      Breath sounds: Normal breath sounds. No wheezing or rales.   Abdominal:      General: Bowel sounds are normal. There is no distension.      Palpations: Abdomen is soft.      Tenderness: There is no abdominal tenderness.   Musculoskeletal:       Normal range of motion.     Skin:General:        Skin is warm. Posterior neck  surgical wound   Neurological:         Mental Status: Patient is alert and oriented to person, place, and time.   Psychiatric:         Behavior: Behavior normal.     Vitals:BP (!) 148/81   Pulse 60   Temp (!) 96.1  F (35.6  C)   Resp 20   Ht 1.626 m (5' 4\")   Wt 114.8 kg (253 lb)   SpO2 97%   BMI 43.43 kg/m   and Body mass index is 43.43 kg/m .    Lab/Diagnostic data:   Recent Results (from the past 240 hour(s))   Asymptomatic COVID-19 Virus (Coronavirus) by PCR Nasopharyngeal    Collection Time: 11/15/22 11:52 AM    Specimen: Nasopharyngeal; Swab   Result Value Ref Range    SARS CoV2 PCR Negative Negative       MEDICATIONS:     Review of your medicines          Accurate as of November 21, 2022 11:59 PM. If you have any questions, ask your nurse or doctor.            CONTINUE these medicines which may have CHANGED, or have new prescriptions. If we are uncertain of the size of tablets/capsules you have at home, strength may be listed as something that might have changed.      Dose / Directions   oxyCODONE IR 10 MG tablet  Commonly known as: ROXICODONE  This may have changed:     when to take this    additional instructions      Dose: 10 mg  Take 1 tablet (10 mg) by mouth every 3 hours as needed for breakthrough pain or moderate to severe pain Take 1 pill for " "moderate pain (4-6/10) and 2 pills for severe pain (7-10/10). Maximum 6 pills per day. Alternate with Tylenol.  Quantity: 30 tablet  Refills: 0        CONTINUE these medicines which have NOT CHANGED      Dose / Directions   acetaminophen 500 MG tablet  Commonly known as: TYLENOL      Dose: 1,000 mg  Take 1,000 mg by mouth 4 times daily And then 1000 mg every 6 hours as needed starting 12/9/22  Refills: 0     calcium carbonate 500 MG tablet  Commonly known as: OS-CAIO      Dose: 4 tablet  Take 4 tablets (2,000 mg) by mouth daily Patient has Calcium supplement, preferred brand \"Pure.\" These 300 mg pills are acceptable to the surgical team. Use 4 pills per dose as she also has calcium in her daily multivitamin.  Quantity: 1 tablet  Refills: 0     carboxymethylcellulose PF 1 % ophthalmic gel  Commonly known as: REFRESH LIQUIGEL      Dose: 1 drop  Place 1 drop into both eyes 4 times daily as needed  Refills: 0     cloNIDine 0.1 MG tablet  Commonly known as: CATAPRES      Dose: 0.1 mg  Take 0.1 mg by mouth daily as needed For sbp>180  Refills: 0     clotrimazole-betamethasone 1-0.05 % external cream  Commonly known as: LOTRISONE      Apply topically 2 times daily as needed  Refills: 0     hydrOXYzine 10 MG tablet  Commonly known as: ATARAX  Used for: Itching, Anxiety      Dose: 10 mg  Take 1 tablet (10 mg) by mouth every 4 hours as needed for anxiety or itching  Quantity: 30 tablet  Refills: 0     latanoprost 0.005 % ophthalmic solution  Commonly known as: XALATAN      Dose: 1 drop  Place 1 drop into both eyes At Bedtime  Refills: 0     levothyroxine 88 MCG tablet  Commonly known as: SYNTHROID/LEVOTHROID      Dose: 88 mcg  Take 88 mcg by mouth daily  Refills: 0     lisinopril 40 MG tablet  Commonly known as: ZESTRIL      Dose: 40 mg  Take 40 mg by mouth At Bedtime  Refills: 0     LORazepam 0.5 MG tablet  Commonly known as: ATIVAN      Dose: 0.5 mg  Take 0.5 mg by mouth every 6 hours as needed for anxiety  Refills: 0   "   metoprolol succinate ER 50 MG 24 hr tablet  Commonly known as: TOPROL XL      Dose: 50 mg  Take 50 mg by mouth At Bedtime  Refills: 0     multivitamin Tabs tablet      Dose: 1 tablet  Take 1 tablet by mouth daily  Refills: 0     omeprazole 20 MG tablet      Dose: 20 mg  Take 20 mg by mouth daily  Refills: 0     polyethylene glycol 17 GM/Dose powder  Commonly known as: MIRALAX      Dose: 17 g  Take 17 g by mouth daily Take while taking opioid medications and until bowels are back to normal routine. Hold for loose stools  Quantity: 510 g  Refills: 0     senna-docusate 8.6-50 MG tablet  Commonly known as: SENOKOT-S/PERICOLACE      Dose: 1 tablet  Take 1 tablet by mouth 2 times daily  Refills: 0        STOP taking    order for DME  Stopped by: Purnima Parson CNP               ASSESSMENT/PLAN  Encounter Diagnoses   Name Primary?     Cervical myelopathy (H) Yes     Type 2 diabetes, HbA1c goal < 7% (H)      Pain management      Physical deconditioning      CERVICAL 7 - THORACIC 1 LAMINECTOMY AND FUSION, LEFT CERVICAL 5 - CERVICAL 6 LAMINOFORAMINOTOMY WITH STEALTH NAVIGATION (Left) follow-up with orthopedic spine, pain control, monitor for changes in CMS    Type 2 diabetes not on medications A1c on 11/3/2022 was 6.2    Pain management Tylenol 4 times daily until 12/8/2022 and then decrease to every 6 hours as needed, hydroxyzine 10 mg 4 times a day, oxycodone every 4 hours as needed    GERD on omeprazole    Anxiety as needed lorazepam    Hypertension continue clonidine, lisinopril, metoprolol succinate    Hypothyroidism on levothyroxine TSH on 10/17/2022 was 2.22    Electronically signed by: Purnima Parson CNP        Sincerely,        Purnima Parson CNP

## 2022-11-21 NOTE — PROGRESS NOTES
[facility] GERIATRIC SERVICES  Chief Complaint   Patient presents with     [facility] F/U     [facility] Medical Record Number:  [id number]  Place of Service where encounter took place:  [facility] ([facility]) [[id number]]  Code Status:  Unknown     HISTORY:      HPI:  Mary Jo Vallejo  is 67 year old (1955) undergoing physical and occupational therapy.  She is with past medical history hypothyroidism, hyperlipidemia, diabetes mellitus type 2, chronic atrial fibrillation, hypertension, chronic pain, reflux who underwent CERVICAL 7 - THORACIC 1 LAMINECTOMY AND FUSION, LEFT CERVICAL 5 - CERVICAL 6 LAMINOFORAMINOTOMY WITH STEALTH NAVIGATION (Left)    Today she is seen to review vital signs, labs, routine visit and to establish care.  She denied chest pain shortness of breath constipation or diarrhea.  She denied any numbness or tingling.  She rated her pain 5 out of 10 and it goes up to 8 out of 10 but then comes back down after pain medications.  She is a type II diabetic does not check fingersticks and last A1c on 11/3/2022 was 6.2.  A face-to-face was also done for PRN Ativan.  Other labs reviewed hemoglobin 11.5 ,BMP within normal limits, TSH on 10/17/2022 was 2.22.    ALLERGIES:Valium [diazepam], Erythromycin, Augmentin, Bee venom, Food, Penicillins, Simvastatin, and Adhesive tape    PAST MEDICAL HISTORY:   Past Medical History:   Diagnosis Date     Acute posthemorrhagic anemia      Body mass index 40.0-44.9, adult (H)      Chronic atrial fibrillation (H)      Diaphragmatic hernia without mention of obstruction or gangrene     Hiatal hernia     DM (diabetes mellitus) (H) 02/01/2011    hospitalized     DM (diabetes mellitus) (H)     oral control     Fibromyalgia      Hyperlipidemia LDL goal <100 03/11/2011    diet controlled     Hypertension      Hypothyroidism 03/11/2011     Intermittent asthma 01/27/2009    Related to bronchitis     Irregular heart beat      Irritable bowel syndrome     Irritable bowel      Other chronic pain      Primary localized osteoarthrosis, lower leg 10/27/2013    Hospitalized     Reflux esophagitis      Sleep apnea      Thyroglossal cyst     query     Unspecified hypothyroidism     Hypothyroidism       PAST SURGICAL HISTORY:   has a past surgical history that includes VAG HYST,RMV TUBE/OVARY; laparoscopic cholecystoenterostomy (1990's); APPENDECTOMY; NONSPECIFIC PROCEDURE (1/19/12); TOTAL KNEE ARTHROPLASTY (10/24/13); DRAIN/INJECT LARGE JOINT/BURSA (10/24/13); and Optical Tracking System Fusion Posterior Cervical Two Levels (Left, 11/11/2022).    FAMILY HISTORY: family history includes Breast Cancer in her maternal aunt and paternal grandmother; Breast Cancer (age of onset: 33) in her sister; Cancer in her maternal grandfather and paternal aunt; Cancer - colorectal (age of onset: 60) in her father; Depression in her mother; EYE* in her father; Hypertension in her mother; Neurologic Disorder in her father.    SOCIAL HISTORY:  reports that she has never smoked. She has never used smokeless tobacco. She reports that she does not currently use alcohol. She reports that she does not use drugs.    ROS:  Constitutional: Negative for activity change, appetite change, fatigue and fever.   HENT: Negative for congestion.    Respiratory: Negative for cough, shortness of breath and wheezing.    Cardiovascular: Negative for chest pain and leg swelling.   Gastrointestinal: Negative for abdominal distention, abdominal pain, constipation, diarrhea and nausea.   Genitourinary: Negative for dysuria.   Musculoskeletal: Negative for arthralgia. Negative for back pain.   Skin: Negative for color change and positive for surgical wound.   Neurological: Negative for dizziness.   Psychiatric/Behavioral: Negative for agitation, behavioral problems and confusion.     Physical Exam:  Constitutional:       Appearance: Patient is well-developed.   HENT:      Head: Normocephalic.   Eyes:      Conjunctiva/sclera: Conjunctivae  "normal.   Neck:      Musculoskeletal: Normal range of motion.   Cardiovascular:      Rate and Rhythm: Normal rate and regular rhythm.      Heart sounds: Normal heart sounds. No murmur.   Pulmonary:      Effort: No respiratory distress.      Breath sounds: Normal breath sounds. No wheezing or rales.   Abdominal:      General: Bowel sounds are normal. There is no distension.      Palpations: Abdomen is soft.      Tenderness: There is no abdominal tenderness.   Musculoskeletal:       Normal range of motion.     Skin:General:        Skin is warm. Posterior neck  surgical wound   Neurological:         Mental Status: Patient is alert and oriented to person, place, and time.   Psychiatric:         Behavior: Behavior normal.     Vitals:BP (!) 148/81   Pulse 60   Temp (!) 96.1  F (35.6  C)   Resp 20   Ht 1.626 m (5' 4\")   Wt 114.8 kg (253 lb)   SpO2 97%   BMI 43.43 kg/m   and Body mass index is 43.43 kg/m .    Lab/Diagnostic data:   Recent Results (from the past 240 hour(s))   Asymptomatic COVID-19 Virus (Coronavirus) by PCR Nasopharyngeal    Collection Time: 11/15/22 11:52 AM    Specimen: Nasopharyngeal; Swab   Result Value Ref Range    SARS CoV2 PCR Negative Negative       MEDICATIONS:     Review of your medicines          Accurate as of November 21, 2022 11:59 PM. If you have any questions, ask your nurse or doctor.            CONTINUE these medicines which may have CHANGED, or have new prescriptions. If we are uncertain of the size of tablets/capsules you have at home, strength may be listed as something that might have changed.      Dose / Directions   oxyCODONE IR 10 MG tablet  Commonly known as: ROXICODONE  This may have changed:     when to take this    additional instructions      Dose: 10 mg  Take 1 tablet (10 mg) by mouth every 3 hours as needed for breakthrough pain or moderate to severe pain Take 1 pill for moderate pain (4-6/10) and 2 pills for severe pain (7-10/10). Maximum 6 pills per day. Alternate " "with Tylenol.  Quantity: 30 tablet  Refills: 0        CONTINUE these medicines which have NOT CHANGED      Dose / Directions   acetaminophen 500 MG tablet  Commonly known as: TYLENOL      Dose: 1,000 mg  Take 1,000 mg by mouth 4 times daily And then 1000 mg every 6 hours as needed starting 12/9/22  Refills: 0     calcium carbonate 500 MG tablet  Commonly known as: OS-CAIO      Dose: 4 tablet  Take 4 tablets (2,000 mg) by mouth daily Patient has Calcium supplement, preferred brand \"Pure.\" These 300 mg pills are acceptable to the surgical team. Use 4 pills per dose as she also has calcium in her daily multivitamin.  Quantity: 1 tablet  Refills: 0     carboxymethylcellulose PF 1 % ophthalmic gel  Commonly known as: REFRESH LIQUIGEL      Dose: 1 drop  Place 1 drop into both eyes 4 times daily as needed  Refills: 0     cloNIDine 0.1 MG tablet  Commonly known as: CATAPRES      Dose: 0.1 mg  Take 0.1 mg by mouth daily as needed For sbp>180  Refills: 0     clotrimazole-betamethasone 1-0.05 % external cream  Commonly known as: LOTRISONE      Apply topically 2 times daily as needed  Refills: 0     hydrOXYzine 10 MG tablet  Commonly known as: ATARAX  Used for: Itching, Anxiety      Dose: 10 mg  Take 1 tablet (10 mg) by mouth every 4 hours as needed for anxiety or itching  Quantity: 30 tablet  Refills: 0     latanoprost 0.005 % ophthalmic solution  Commonly known as: XALATAN      Dose: 1 drop  Place 1 drop into both eyes At Bedtime  Refills: 0     levothyroxine 88 MCG tablet  Commonly known as: SYNTHROID/LEVOTHROID      Dose: 88 mcg  Take 88 mcg by mouth daily  Refills: 0     lisinopril 40 MG tablet  Commonly known as: ZESTRIL      Dose: 40 mg  Take 40 mg by mouth At Bedtime  Refills: 0     LORazepam 0.5 MG tablet  Commonly known as: ATIVAN      Dose: 0.5 mg  Take 0.5 mg by mouth every 6 hours as needed for anxiety  Refills: 0     metoprolol succinate ER 50 MG 24 hr tablet  Commonly known as: TOPROL XL      Dose: 50 mg  Take 50 " mg by mouth At Bedtime  Refills: 0     multivitamin Tabs tablet      Dose: 1 tablet  Take 1 tablet by mouth daily  Refills: 0     omeprazole 20 MG tablet      Dose: 20 mg  Take 20 mg by mouth daily  Refills: 0     polyethylene glycol 17 GM/Dose powder  Commonly known as: MIRALAX      Dose: 17 g  Take 17 g by mouth daily Take while taking opioid medications and until bowels are back to normal routine. Hold for loose stools  Quantity: 510 g  Refills: 0     senna-docusate 8.6-50 MG tablet  Commonly known as: SENOKOT-S/PERICOLACE      Dose: 1 tablet  Take 1 tablet by mouth 2 times daily  Refills: 0        STOP taking    order for DME  Stopped by: Purnima Parson CNP               ASSESSMENT/PLAN  Encounter Diagnoses   Name Primary?     Cervical myelopathy (H) Yes     Type 2 diabetes, HbA1c goal < 7% (H)      Pain management      Physical deconditioning      CERVICAL 7 - THORACIC 1 LAMINECTOMY AND FUSION, LEFT CERVICAL 5 - CERVICAL 6 LAMINOFORAMINOTOMY WITH STEALTH NAVIGATION (Left) follow-up with orthopedic spine, pain control, monitor for changes in CMS    Type 2 diabetes not on medications A1c on 11/3/2022 was 6.2    Pain management Tylenol 4 times daily until 12/8/2022 and then decrease to every 6 hours as needed, hydroxyzine 10 mg 4 times a day, oxycodone every 4 hours as needed    GERD on omeprazole    Anxiety as needed lorazepam    Hypertension continue clonidine, lisinopril, metoprolol succinate    Hypothyroidism on levothyroxine TSH on 10/17/2022 was 2.22    Electronically signed by: Purnima Parson CNP

## 2022-11-28 ENCOUNTER — TRANSITIONAL CARE UNIT VISIT (OUTPATIENT)
Dept: GERIATRICS | Facility: CLINIC | Age: 67
End: 2022-11-28
Payer: COMMERCIAL

## 2022-11-28 VITALS
BODY MASS INDEX: 41.55 KG/M2 | RESPIRATION RATE: 18 BRPM | OXYGEN SATURATION: 98 % | DIASTOLIC BLOOD PRESSURE: 81 MMHG | HEART RATE: 65 BPM | WEIGHT: 243.4 LBS | HEIGHT: 64 IN | TEMPERATURE: 98.5 F | SYSTOLIC BLOOD PRESSURE: 157 MMHG

## 2022-11-28 DIAGNOSIS — R53.81 PHYSICAL DECONDITIONING: ICD-10-CM

## 2022-11-28 DIAGNOSIS — G95.9 CERVICAL MYELOPATHY (H): Primary | ICD-10-CM

## 2022-11-28 DIAGNOSIS — R52 PAIN MANAGEMENT: ICD-10-CM

## 2022-11-28 DIAGNOSIS — E11.9 TYPE 2 DIABETES, HBA1C GOAL < 7% (H): ICD-10-CM

## 2022-11-28 PROCEDURE — 99309 SBSQ NF CARE MODERATE MDM 30: CPT | Performed by: NURSE PRACTITIONER

## 2022-11-28 RX ORDER — CEPHALEXIN 500 MG/1
500 CAPSULE ORAL 2 TIMES DAILY
COMMUNITY
Start: 2022-11-22 | End: 2022-12-02

## 2022-11-28 NOTE — PROGRESS NOTES
[facility] GERIATRIC SERVICES  Chief Complaint   Patient presents with     TAMIKO     [place] Medical Record Number:  [id number]  Place of Service where encounter took place:  [facility] ([facility]) [[id number]]  Code Status:  Unknown     HISTORY:      HPI:  Mary Jo Vallejo  is 67 year old (1955) undergoing physical and occupational therapy.  She is with past medical history hypothyroidism, hyperlipidemia, diabetes mellitus type 2, chronic atrial fibrillation, hypertension, chronic pain, reflux who underwent CERVICAL 7 - THORACIC 1 LAMINECTOMY AND FUSION, LEFT CERVICAL 5 - CERVICAL 6 LAMINOFORAMINOTOMY WITH STEALTH NAVIGATION (Left)    Today she is seen to review vital signs, labs, routine visit.  She recently followed up with neurosurgery and was placed on Keflex twice daily until 12/2/2022.  Patient reports she had some drainage at her visit and it was more for prophylaxis.  Her incision was monitored today by writer and nurse and there was no redness or drainage noted.  she denied chest pain shortness of breath constipation or diarrhea.  She denied any numbness or tingling.  She reports her pain is controlled.  She is a type II diabetic does not check fingersticks and last A1c on 11/3/2022 was 6.2.  Other labs reviewed hemoglobin 11.5 ,BMP within normal limits, TSH on 10/17/2022 was 2.22.    ALLERGIES:Valium [diazepam], Erythromycin, Augmentin, Bee venom, Food, Penicillins, Simvastatin, and Adhesive tape    PAST MEDICAL HISTORY:   Past Medical History:   Diagnosis Date     Acute posthemorrhagic anemia      Body mass index 40.0-44.9, adult (H)      Chronic atrial fibrillation (H)      Diaphragmatic hernia without mention of obstruction or gangrene     Hiatal hernia     DM (diabetes mellitus) (H) 02/01/2011    hospitalized     DM (diabetes mellitus) (H)     oral control     Fibromyalgia      Hyperlipidemia LDL goal <100 03/11/2011    diet controlled     Hypertension      Hypothyroidism 03/11/2011      Intermittent asthma 01/27/2009    Related to bronchitis     Irregular heart beat      Irritable bowel syndrome     Irritable bowel     Other chronic pain      Primary localized osteoarthrosis, lower leg 10/27/2013    Hospitalized     Reflux esophagitis      Sleep apnea      Thyroglossal cyst     query     Unspecified hypothyroidism     Hypothyroidism       PAST SURGICAL HISTORY:   has a past surgical history that includes VAG HYST,RMV TUBE/OVARY; laparoscopic cholecystoenterostomy (1990's); APPENDECTOMY; NONSPECIFIC PROCEDURE (1/19/12); TOTAL KNEE ARTHROPLASTY (10/24/13); DRAIN/INJECT LARGE JOINT/BURSA (10/24/13); and Optical Tracking System Fusion Posterior Cervical Two Levels (Left, 11/11/2022).    FAMILY HISTORY: family history includes Breast Cancer in her maternal aunt and paternal grandmother; Breast Cancer (age of onset: 33) in her sister; Cancer in her maternal grandfather and paternal aunt; Cancer - colorectal (age of onset: 60) in her father; Depression in her mother; EYE* in her father; Hypertension in her mother; Neurologic Disorder in her father.    SOCIAL HISTORY:  reports that she has never smoked. She has never used smokeless tobacco. She reports that she does not currently use alcohol. She reports that she does not use drugs.    ROS:  Constitutional: Negative for activity change, appetite change, fatigue and fever.   HENT: Negative for congestion.    Respiratory: Negative for cough, shortness of breath and wheezing.    Cardiovascular: Negative for chest pain and leg swelling.   Gastrointestinal: Negative for abdominal distention, abdominal pain, constipation, diarrhea and nausea.   Genitourinary: Negative for dysuria.   Musculoskeletal: Negative for arthralgia. Negative for back pain.   Skin: Negative for color change and positive for surgical wound.   Neurological: Negative for dizziness.   Psychiatric/Behavioral: Negative for agitation, behavioral problems and confusion.     Physical  "Exam:  Constitutional:       Appearance: Patient is well-developed.   HENT:      Head: Normocephalic.   Eyes:      Conjunctiva/sclera: Conjunctivae normal.   Neck:      Musculoskeletal: Normal range of motion.   Cardiovascular:      Rate and Rhythm: Normal rate and regular rhythm.      Heart sounds: Normal heart sounds. No murmur.   Pulmonary:      Effort: No respiratory distress.      Breath sounds: Normal breath sounds. No wheezing or rales.   Abdominal:      General: Bowel sounds are normal. There is no distension.      Palpations: Abdomen is soft.      Tenderness: There is no abdominal tenderness.   Musculoskeletal:       Normal range of motion.     Skin:General:        Skin is warm. Posterior neck  surgical wound clean dry and intact  Neurological:         Mental Status: Patient is alert and oriented to person, place, and time.   Psychiatric:         Behavior: Behavior normal.     Vitals:BP (!) 157/81   Pulse 65   Temp 98.5  F (36.9  C)   Resp 18   Ht 1.626 m (5' 4\")   Wt 110.4 kg (243 lb 6.4 oz)   SpO2 98%   BMI 41.78 kg/m   and Body mass index is 41.78 kg/m .    Lab/Diagnostic data:   No results found for this or any previous visit (from the past 240 hour(s)).    MEDICATIONS:     Review of your medicines          Accurate as of November 28, 2022  1:27 PM. If you have any questions, ask your nurse or doctor.            CONTINUE these medicines which may have CHANGED, or have new prescriptions. If we are uncertain of the size of tablets/capsules you have at home, strength may be listed as something that might have changed.      Dose / Directions   oxyCODONE IR 10 MG tablet  Commonly known as: ROXICODONE  This may have changed:     when to take this    additional instructions      Dose: 10 mg  Take 1 tablet (10 mg) by mouth every 3 hours as needed for breakthrough pain or moderate to severe pain Take 1 pill for moderate pain (4-6/10) and 2 pills for severe pain (7-10/10). Maximum 6 pills per day. " "Alternate with Tylenol.  Quantity: 30 tablet  Refills: 0        CONTINUE these medicines which have NOT CHANGED      Dose / Directions   acetaminophen 500 MG tablet  Commonly known as: TYLENOL      Dose: 1,000 mg  Take 1,000 mg by mouth 4 times daily And then 1000 mg every 6 hours as needed starting 12/9/22  Refills: 0     calcium carbonate 500 MG tablet  Commonly known as: OS-CAIO      Dose: 4 tablet  Take 4 tablets (2,000 mg) by mouth daily Patient has Calcium supplement, preferred brand \"Pure.\" These 300 mg pills are acceptable to the surgical team. Use 4 pills per dose as she also has calcium in her daily multivitamin.  Quantity: 1 tablet  Refills: 0     carboxymethylcellulose PF 1 % ophthalmic gel  Commonly known as: REFRESH LIQUIGEL      Dose: 1 drop  Place 1 drop into both eyes 4 times daily as needed  Refills: 0     cephALEXin 500 MG capsule  Commonly known as: KEFLEX      Dose: 500 mg  Take 500 mg by mouth 2 times daily  Refills: 0     cloNIDine 0.1 MG tablet  Commonly known as: CATAPRES      Dose: 0.1 mg  Take 0.1 mg by mouth daily as needed For sbp>180  Refills: 0     clotrimazole-betamethasone 1-0.05 % external cream  Commonly known as: LOTRISONE      Apply topically 2 times daily as needed  Refills: 0     hydrOXYzine 10 MG tablet  Commonly known as: ATARAX  Used for: Itching, Anxiety      Dose: 10 mg  Take 1 tablet (10 mg) by mouth every 4 hours as needed for anxiety or itching  Quantity: 30 tablet  Refills: 0     latanoprost 0.005 % ophthalmic solution  Commonly known as: XALATAN      Dose: 1 drop  Place 1 drop into both eyes At Bedtime  Refills: 0     levothyroxine 88 MCG tablet  Commonly known as: SYNTHROID/LEVOTHROID      Dose: 88 mcg  Take 88 mcg by mouth daily  Refills: 0     lisinopril 40 MG tablet  Commonly known as: ZESTRIL      Dose: 40 mg  Take 40 mg by mouth At Bedtime  Refills: 0     LORazepam 0.5 MG tablet  Commonly known as: ATIVAN      Dose: 0.5 mg  Take 0.5 mg by mouth every 6 hours as " needed for anxiety  Refills: 0     metoprolol succinate ER 50 MG 24 hr tablet  Commonly known as: TOPROL XL      Dose: 50 mg  Take 50 mg by mouth At Bedtime  Refills: 0     multivitamin Tabs tablet      Dose: 1 tablet  Take 1 tablet by mouth daily  Refills: 0     omeprazole 20 MG tablet      Dose: 20 mg  Take 20 mg by mouth daily  Refills: 0     polyethylene glycol 17 GM/Dose powder  Commonly known as: MIRALAX      Dose: 17 g  Take 17 g by mouth daily Take while taking opioid medications and until bowels are back to normal routine. Hold for loose stools  Quantity: 510 g  Refills: 0     senna-docusate 8.6-50 MG tablet  Commonly known as: SENOKOT-S/PERICOLACE      Dose: 1 tablet  Take 1 tablet by mouth 2 times daily  Refills: 0            ASSESSMENT/PLAN  Encounter Diagnoses   Name Primary?     Cervical myelopathy (H) Yes     Type 2 diabetes, HbA1c goal < 7% (H)      Pain management      Physical deconditioning      CERVICAL 7 - THORACIC 1 LAMINECTOMY AND FUSION, LEFT CERVICAL 5 - CERVICAL 6 LAMINOFORAMINOTOMY WITH STEALTH NAVIGATION (Left) follow-up with orthopedic spine, pain control, monitor for changes in CMS started on prophylactic Keflex twice daily until 12/2/2022 by neurosurgery.    Type 2 diabetes not on medications A1c on 11/3/2022 was 6.2    Pain management Tylenol 4 times daily until 12/8/2022 and then decrease to every 6 hours as needed, hydroxyzine 10 mg 4 times a day, oxycodone every 4 hours as needed    GERD on omeprazole    Anxiety as needed lorazepam    Hypertension continue clonidine, lisinopril, metoprolol succinate    Hypothyroidism on levothyroxine TSH on 10/17/2022 was 2.22    Electronically signed by: Purnima Parson, CNP

## 2022-11-28 NOTE — LETTER
11/28/2022        RE: Mary Jo Vallejo   [address]  [facility] [id number]        [facility] GERIATRIC SERVICES  Chief Complaint   Patient presents with     TAMIKO     [place] Medical Record Number:  [id number]  Place of Service where encounter took place:  [facility] ([facility]) [[id number]]  Code Status:  Unknown     HISTORY:      HPI:  Mary Jo Vallejo  is 67 year old (1955) undergoing physical and occupational therapy.  She is with past medical history hypothyroidism, hyperlipidemia, diabetes mellitus type 2, chronic atrial fibrillation, hypertension, chronic pain, reflux who underwent CERVICAL 7 - THORACIC 1 LAMINECTOMY AND FUSION, LEFT CERVICAL 5 - CERVICAL 6 LAMINOFORAMINOTOMY WITH STEALTH NAVIGATION (Left)    Today she is seen to review vital signs, labs, routine visit.  She recently followed up with neurosurgery and was placed on Keflex twice daily until 12/2/2022.  Patient reports she had some drainage at her visit and it was more for prophylaxis.  Her incision was monitored today by writer and nurse and there was no redness or drainage noted.  she denied chest pain shortness of breath constipation or diarrhea.  She denied any numbness or tingling.  She reports her pain is controlled.  She is a type II diabetic does not check fingersticks and last A1c on 11/3/2022 was 6.2.  Other labs reviewed hemoglobin 11.5 ,BMP within normal limits, TSH on 10/17/2022 was 2.22.    ALLERGIES:Valium [diazepam], Erythromycin, Augmentin, Bee venom, Food, Penicillins, Simvastatin, and Adhesive tape    PAST MEDICAL HISTORY:   Past Medical History:   Diagnosis Date     Acute posthemorrhagic anemia      Body mass index 40.0-44.9, adult (H)      Chronic atrial fibrillation (H)      Diaphragmatic hernia without mention of obstruction or gangrene     Hiatal hernia     DM (diabetes mellitus) (H) 02/01/2011    hospitalized     DM (diabetes mellitus) (H)     oral control     Fibromyalgia      Hyperlipidemia LDL goal  <100 03/11/2011    diet controlled     Hypertension      Hypothyroidism 03/11/2011     Intermittent asthma 01/27/2009    Related to bronchitis     Irregular heart beat      Irritable bowel syndrome     Irritable bowel     Other chronic pain      Primary localized osteoarthrosis, lower leg 10/27/2013    Hospitalized     Reflux esophagitis      Sleep apnea      Thyroglossal cyst     query     Unspecified hypothyroidism     Hypothyroidism       PAST SURGICAL HISTORY:   has a past surgical history that includes VAG HYST,RMV TUBE/OVARY; laparoscopic cholecystoenterostomy (1990's); APPENDECTOMY; NONSPECIFIC PROCEDURE (1/19/12); TOTAL KNEE ARTHROPLASTY (10/24/13); DRAIN/INJECT LARGE JOINT/BURSA (10/24/13); and Optical Tracking System Fusion Posterior Cervical Two Levels (Left, 11/11/2022).    FAMILY HISTORY: family history includes Breast Cancer in her maternal aunt and paternal grandmother; Breast Cancer (age of onset: 33) in her sister; Cancer in her maternal grandfather and paternal aunt; Cancer - colorectal (age of onset: 60) in her father; Depression in her mother; EYE* in her father; Hypertension in her mother; Neurologic Disorder in her father.    SOCIAL HISTORY:  reports that she has never smoked. She has never used smokeless tobacco. She reports that she does not currently use alcohol. She reports that she does not use drugs.    ROS:  Constitutional: Negative for activity change, appetite change, fatigue and fever.   HENT: Negative for congestion.    Respiratory: Negative for cough, shortness of breath and wheezing.    Cardiovascular: Negative for chest pain and leg swelling.   Gastrointestinal: Negative for abdominal distention, abdominal pain, constipation, diarrhea and nausea.   Genitourinary: Negative for dysuria.   Musculoskeletal: Negative for arthralgia. Negative for back pain.   Skin: Negative for color change and positive for surgical wound.   Neurological: Negative for dizziness.  "  Psychiatric/Behavioral: Negative for agitation, behavioral problems and confusion.     Physical Exam:  Constitutional:       Appearance: Patient is well-developed.   HENT:      Head: Normocephalic.   Eyes:      Conjunctiva/sclera: Conjunctivae normal.   Neck:      Musculoskeletal: Normal range of motion.   Cardiovascular:      Rate and Rhythm: Normal rate and regular rhythm.      Heart sounds: Normal heart sounds. No murmur.   Pulmonary:      Effort: No respiratory distress.      Breath sounds: Normal breath sounds. No wheezing or rales.   Abdominal:      General: Bowel sounds are normal. There is no distension.      Palpations: Abdomen is soft.      Tenderness: There is no abdominal tenderness.   Musculoskeletal:       Normal range of motion.     Skin:General:        Skin is warm. Posterior neck  surgical wound clean dry and intact  Neurological:         Mental Status: Patient is alert and oriented to person, place, and time.   Psychiatric:         Behavior: Behavior normal.     Vitals:BP (!) 157/81   Pulse 65   Temp 98.5  F (36.9  C)   Resp 18   Ht 1.626 m (5' 4\")   Wt 110.4 kg (243 lb 6.4 oz)   SpO2 98%   BMI 41.78 kg/m   and Body mass index is 41.78 kg/m .    Lab/Diagnostic data:   No results found for this or any previous visit (from the past 240 hour(s)).    MEDICATIONS:     Review of your medicines          Accurate as of November 28, 2022  1:27 PM. If you have any questions, ask your nurse or doctor.            CONTINUE these medicines which may have CHANGED, or have new prescriptions. If we are uncertain of the size of tablets/capsules you have at home, strength may be listed as something that might have changed.      Dose / Directions   oxyCODONE IR 10 MG tablet  Commonly known as: ROXICODONE  This may have changed:     when to take this    additional instructions      Dose: 10 mg  Take 1 tablet (10 mg) by mouth every 3 hours as needed for breakthrough pain or moderate to severe pain Take 1 pill " "for moderate pain (4-6/10) and 2 pills for severe pain (7-10/10). Maximum 6 pills per day. Alternate with Tylenol.  Quantity: 30 tablet  Refills: 0        CONTINUE these medicines which have NOT CHANGED      Dose / Directions   acetaminophen 500 MG tablet  Commonly known as: TYLENOL      Dose: 1,000 mg  Take 1,000 mg by mouth 4 times daily And then 1000 mg every 6 hours as needed starting 12/9/22  Refills: 0     calcium carbonate 500 MG tablet  Commonly known as: OS-CAIO      Dose: 4 tablet  Take 4 tablets (2,000 mg) by mouth daily Patient has Calcium supplement, preferred brand \"Pure.\" These 300 mg pills are acceptable to the surgical team. Use 4 pills per dose as she also has calcium in her daily multivitamin.  Quantity: 1 tablet  Refills: 0     carboxymethylcellulose PF 1 % ophthalmic gel  Commonly known as: REFRESH LIQUIGEL      Dose: 1 drop  Place 1 drop into both eyes 4 times daily as needed  Refills: 0     cephALEXin 500 MG capsule  Commonly known as: KEFLEX      Dose: 500 mg  Take 500 mg by mouth 2 times daily  Refills: 0     cloNIDine 0.1 MG tablet  Commonly known as: CATAPRES      Dose: 0.1 mg  Take 0.1 mg by mouth daily as needed For sbp>180  Refills: 0     clotrimazole-betamethasone 1-0.05 % external cream  Commonly known as: LOTRISONE      Apply topically 2 times daily as needed  Refills: 0     hydrOXYzine 10 MG tablet  Commonly known as: ATARAX  Used for: Itching, Anxiety      Dose: 10 mg  Take 1 tablet (10 mg) by mouth every 4 hours as needed for anxiety or itching  Quantity: 30 tablet  Refills: 0     latanoprost 0.005 % ophthalmic solution  Commonly known as: XALATAN      Dose: 1 drop  Place 1 drop into both eyes At Bedtime  Refills: 0     levothyroxine 88 MCG tablet  Commonly known as: SYNTHROID/LEVOTHROID      Dose: 88 mcg  Take 88 mcg by mouth daily  Refills: 0     lisinopril 40 MG tablet  Commonly known as: ZESTRIL      Dose: 40 mg  Take 40 mg by mouth At Bedtime  Refills: 0     LORazepam 0.5 MG " tablet  Commonly known as: ATIVAN      Dose: 0.5 mg  Take 0.5 mg by mouth every 6 hours as needed for anxiety  Refills: 0     metoprolol succinate ER 50 MG 24 hr tablet  Commonly known as: TOPROL XL      Dose: 50 mg  Take 50 mg by mouth At Bedtime  Refills: 0     multivitamin Tabs tablet      Dose: 1 tablet  Take 1 tablet by mouth daily  Refills: 0     omeprazole 20 MG tablet      Dose: 20 mg  Take 20 mg by mouth daily  Refills: 0     polyethylene glycol 17 GM/Dose powder  Commonly known as: MIRALAX      Dose: 17 g  Take 17 g by mouth daily Take while taking opioid medications and until bowels are back to normal routine. Hold for loose stools  Quantity: 510 g  Refills: 0     senna-docusate 8.6-50 MG tablet  Commonly known as: SENOKOT-S/PERICOLACE      Dose: 1 tablet  Take 1 tablet by mouth 2 times daily  Refills: 0            ASSESSMENT/PLAN  Encounter Diagnoses   Name Primary?     Cervical myelopathy (H) Yes     Type 2 diabetes, HbA1c goal < 7% (H)      Pain management      Physical deconditioning      CERVICAL 7 - THORACIC 1 LAMINECTOMY AND FUSION, LEFT CERVICAL 5 - CERVICAL 6 LAMINOFORAMINOTOMY WITH STEALTH NAVIGATION (Left) follow-up with orthopedic spine, pain control, monitor for changes in CMS started on prophylactic Keflex twice daily until 12/2/2022 by neurosurgery.    Type 2 diabetes not on medications A1c on 11/3/2022 was 6.2    Pain management Tylenol 4 times daily until 12/8/2022 and then decrease to every 6 hours as needed, hydroxyzine 10 mg 4 times a day, oxycodone every 4 hours as needed    GERD on omeprazole    Anxiety as needed lorazepam    Hypertension continue clonidine, lisinopril, metoprolol succinate    Hypothyroidism on levothyroxine TSH on 10/17/2022 was 2.22    Electronically signed by: Purnima Parson CNP        Sincerely,        Purnima Parson CNP

## 2022-11-30 ENCOUNTER — DISCHARGE SUMMARY NURSING HOME (OUTPATIENT)
Dept: GERIATRICS | Facility: CLINIC | Age: 67
End: 2022-11-30
Payer: COMMERCIAL

## 2022-11-30 VITALS
HEART RATE: 57 BPM | OXYGEN SATURATION: 96 % | BODY MASS INDEX: 41.48 KG/M2 | DIASTOLIC BLOOD PRESSURE: 78 MMHG | TEMPERATURE: 97.5 F | WEIGHT: 243 LBS | SYSTOLIC BLOOD PRESSURE: 143 MMHG | HEIGHT: 64 IN | RESPIRATION RATE: 18 BRPM

## 2022-11-30 DIAGNOSIS — R53.81 PHYSICAL DECONDITIONING: ICD-10-CM

## 2022-11-30 DIAGNOSIS — G95.9 CERVICAL MYELOPATHY (H): Primary | ICD-10-CM

## 2022-11-30 DIAGNOSIS — R52 PAIN MANAGEMENT: ICD-10-CM

## 2022-11-30 DIAGNOSIS — F41.9 ANXIETY: ICD-10-CM

## 2022-11-30 PROCEDURE — 99316 NF DSCHRG MGMT 30 MIN+: CPT | Performed by: NURSE PRACTITIONER

## 2022-11-30 RX ORDER — OXYCODONE HYDROCHLORIDE 10 MG/1
10 TABLET ORAL EVERY 4 HOURS PRN
COMMUNITY

## 2022-11-30 NOTE — PROGRESS NOTES
[facility] GERIATRIC SERVICES  Chief Complaint   Patient presents with     Discharge Summary [facility] Medical Record Number:  [id number]  Place of Service where encounter took place:  [facility] ([facility]) [[id number]]  Code Status:  Unknown     HISTORY:      HPI:  Mary Jo Vallejo  is 67 year old (1955) undergoing physical and occupational therapy.  She is with past medical history hypothyroidism, hyperlipidemia, diabetes mellitus type 2, chronic atrial fibrillation, hypertension, chronic pain, reflux who underwent CERVICAL 7 - THORACIC 1 LAMINECTOMY AND FUSION, LEFT CERVICAL 5 - CERVICAL 6 LAMINOFORAMINOTOMY WITH STEALTH NAVIGATION (Left)    Today she is seen to review vital signs, labs, routine visit and a face-to-face for discharge.  She will discharge to home on 12/2/2022 with current medications and treatments.  She will have home care services PT OT home health aide RN.  She recently followed up with neurosurgery and was placed on Keflex twice daily until 12/2/2022.   Her incision is without redness or drainage noted.  she denied chest pain shortness of breath constipation or diarrhea.  She denied any numbness or tingling.  She reports her pain is controlled.  She is a type II diabetic does not check fingersticks and last A1c on 11/3/2022 was 6.2.  Other labs reviewed hemoglobin 11.5 ,BMP within normal limits, TSH on 10/17/2022 was 2.22.  She does have anxiety and on lorazepam.    ALLERGIES:Valium [diazepam], Erythromycin, Augmentin, Bee venom, Food, Penicillins, Simvastatin, and Adhesive tape    PAST MEDICAL HISTORY:   Past Medical History:   Diagnosis Date     Acute posthemorrhagic anemia      Body mass index 40.0-44.9, adult (H)      Chronic atrial fibrillation (H)      Diaphragmatic hernia without mention of obstruction or gangrene     Hiatal hernia     DM (diabetes mellitus) (H) 02/01/2011    hospitalized     DM (diabetes mellitus) (H)     oral control     Fibromyalgia       Hyperlipidemia LDL goal <100 03/11/2011    diet controlled     Hypertension      Hypothyroidism 03/11/2011     Intermittent asthma 01/27/2009    Related to bronchitis     Irregular heart beat      Irritable bowel syndrome     Irritable bowel     Other chronic pain      Primary localized osteoarthrosis, lower leg 10/27/2013    Hospitalized     Reflux esophagitis      Sleep apnea      Thyroglossal cyst     query     Unspecified hypothyroidism     Hypothyroidism       PAST SURGICAL HISTORY:   has a past surgical history that includes VAG HYST,RMV TUBE/OVARY; laparoscopic cholecystoenterostomy (1990's); APPENDECTOMY; NONSPECIFIC PROCEDURE (1/19/12); TOTAL KNEE ARTHROPLASTY (10/24/13); DRAIN/INJECT LARGE JOINT/BURSA (10/24/13); and Optical Tracking System Fusion Posterior Cervical Two Levels (Left, 11/11/2022).    FAMILY HISTORY: family history includes Breast Cancer in her maternal aunt and paternal grandmother; Breast Cancer (age of onset: 33) in her sister; Cancer in her maternal grandfather and paternal aunt; Cancer - colorectal (age of onset: 60) in her father; Depression in her mother; EYE* in her father; Hypertension in her mother; Neurologic Disorder in her father.    SOCIAL HISTORY:  reports that she has never smoked. She has never used smokeless tobacco. She reports that she does not currently use alcohol. She reports that she does not use drugs.    ROS:  Constitutional: Negative for activity change, appetite change, fatigue and fever.   HENT: Negative for congestion.    Respiratory: Negative for cough, shortness of breath and wheezing.    Cardiovascular: Negative for chest pain and leg swelling.   Gastrointestinal: Negative for abdominal distention, abdominal pain, constipation, diarrhea and nausea.   Genitourinary: Negative for dysuria.   Musculoskeletal: Negative for arthralgia. Negative for back pain.   Skin: Negative for color change and positive for surgical wound.   Neurological: Negative for dizziness.  "  Psychiatric/Behavioral: Negative for agitation, behavioral problems and confusion.  Positive for anxiety    Physical Exam:  Constitutional:       Appearance: Patient is well-developed.   HENT:      Head: Normocephalic.   Eyes:      Conjunctiva/sclera: Conjunctivae normal.   Neck:      Musculoskeletal: Normal range of motion.   Cardiovascular:      Rate and Rhythm: Normal rate and regular rhythm.      Heart sounds: Normal heart sounds. No murmur.   Pulmonary:      Effort: No respiratory distress.      Breath sounds: Normal breath sounds. No wheezing or rales.   Abdominal:      General: Bowel sounds are normal. There is no distension.      Palpations: Abdomen is soft.      Tenderness: There is no abdominal tenderness.   Musculoskeletal:       Normal range of motion.     Skin:General:        Skin is warm. Posterior neck  surgical wound clean dry and intact  Neurological:         Mental Status: Patient is alert and oriented to person, place, and time.   Psychiatric:         Behavior: Behavior normal.     Vitals:BP (!) 143/78   Pulse 57   Temp 97.5  F (36.4  C)   Resp 18   Ht 1.626 m (5' 4\")   Wt 110.2 kg (243 lb)   SpO2 96%   BMI 41.71 kg/m   and Body mass index is 41.71 kg/m .    Lab/Diagnostic data:   No results found for this or any previous visit (from the past 240 hour(s)).    MEDICATIONS:     Review of your medicines          Accurate as of November 30, 2022 11:59 AM. If you have any questions, ask your nurse or doctor.            CONTINUE these medicines which may have CHANGED, or have new prescriptions. If we are uncertain of the size of tablets/capsules you have at home, strength may be listed as something that might have changed.      Dose / Directions   oxyCODONE IR 10 MG tablet  Commonly known as: ROXICODONE  This may have changed: Another medication with the same name was removed. Continue taking this medication, and follow the directions you see here.  Changed by: Purnima Parson CNP      Dose: 10 " "mg  Take 10 mg by mouth every 4 hours as needed for severe pain (7-10)  Refills: 0        CONTINUE these medicines which have NOT CHANGED      Dose / Directions   acetaminophen 500 MG tablet  Commonly known as: TYLENOL      Dose: 1,000 mg  Take 1,000 mg by mouth 4 times daily And then 1000 mg every 6 hours as needed starting 12/9/22  Refills: 0     calcium carbonate 500 MG tablet  Commonly known as: OS-CAIO      Dose: 4 tablet  Take 4 tablets (2,000 mg) by mouth daily Patient has Calcium supplement, preferred brand \"Pure.\" These 300 mg pills are acceptable to the surgical team. Use 4 pills per dose as she also has calcium in her daily multivitamin.  Quantity: 1 tablet  Refills: 0     carboxymethylcellulose PF 1 % ophthalmic gel  Commonly known as: REFRESH LIQUIGEL      Dose: 1 drop  Place 1 drop into both eyes 4 times daily as needed  Refills: 0     cephALEXin 500 MG capsule  Commonly known as: KEFLEX      Dose: 500 mg  Take 500 mg by mouth 2 times daily  Refills: 0     cloNIDine 0.1 MG tablet  Commonly known as: CATAPRES      Dose: 0.1 mg  Take 0.1 mg by mouth daily as needed For sbp>180  Refills: 0     clotrimazole-betamethasone 1-0.05 % external cream  Commonly known as: LOTRISONE      Apply topically 2 times daily as needed  Refills: 0     hydrOXYzine 10 MG tablet  Commonly known as: ATARAX  Used for: Itching, Anxiety      Dose: 10 mg  Take 1 tablet (10 mg) by mouth every 4 hours as needed for anxiety or itching  Quantity: 30 tablet  Refills: 0     latanoprost 0.005 % ophthalmic solution  Commonly known as: XALATAN      Dose: 1 drop  Place 1 drop into both eyes At Bedtime  Refills: 0     levothyroxine 88 MCG tablet  Commonly known as: SYNTHROID/LEVOTHROID      Dose: 88 mcg  Take 88 mcg by mouth daily  Refills: 0     lisinopril 40 MG tablet  Commonly known as: ZESTRIL      Dose: 40 mg  Take 40 mg by mouth At Bedtime  Refills: 0     LORazepam 0.5 MG tablet  Commonly known as: ATIVAN      Dose: 0.5 mg  Take 0.5 mg " by mouth every 6 hours as needed for anxiety  Refills: 0     metoprolol succinate ER 50 MG 24 hr tablet  Commonly known as: TOPROL XL      Dose: 50 mg  Take 50 mg by mouth At Bedtime  Refills: 0     multivitamin Tabs tablet      Dose: 1 tablet  Take 1 tablet by mouth daily  Refills: 0     omeprazole 20 MG tablet      Dose: 20 mg  Take 20 mg by mouth daily  Refills: 0     polyethylene glycol 17 GM/Dose powder  Commonly known as: MIRALAX      Dose: 17 g  Take 17 g by mouth daily Take while taking opioid medications and until bowels are back to normal routine. Hold for loose stools  Quantity: 510 g  Refills: 0     senna-docusate 8.6-50 MG tablet  Commonly known as: SENOKOT-S/PERICOLACE      Dose: 1 tablet  Take 1 tablet by mouth 2 times daily  Refills: 0            ASSESSMENT/PLAN  Encounter Diagnoses   Name Primary?     Cervical myelopathy (H) Yes     Pain management      Physical deconditioning      Anxiety      CERVICAL 7 - THORACIC 1 LAMINECTOMY AND FUSION, LEFT CERVICAL 5 - CERVICAL 6 LAMINOFORAMINOTOMY WITH STEALTH NAVIGATION (Left) follow-up with orthopedic spine, pain control, monitor for changes in CMS started on prophylactic Keflex twice daily until 12/2/2022 by neurosurgery.    Type 2 diabetes not on medications A1c on 11/3/2022 was 6.2    Pain management Tylenol 4 times daily until 12/8/2022 and then decrease to every 6 hours as needed, hydroxyzine 10 mg 4 times a day, oxycodone every 4 hours as needed    GERD on omeprazole    Anxiety as needed lorazepam    Hypertension continue clonidine, lisinopril, metoprolol succinate    Hypothyroidism on levothyroxine TSH on 10/17/2022 was 2.22    Physical deconditioning she will have home care services PT OT home health aide RN    DISCHARGE PLAN/FACE TO FACE:  I certify that services are/were furnished while this patient was under the care of a physician and that a physician or an allowed non-physician practitioner (NPP), had a face-to-face encounter that meets the  physician face-to-face encounter requirements. The encounter was in whole, or in part, related to the primary reason for home health. The patient is confined to his/her home and needs intermittent skilled nursing, physical therapy, speech-language pathology, or the continued need for occupational therapy. A plan of care has been established by a physician and is periodically reviewed by a physician.  Date of Face-to-Face Encounter: 11/30/2022    I certify that, based on my findings, the following services are medically necessary home health services: PT OT home health aide RN    My clinical findings support the need for the above skilled services because: PT OT for continued strength and endurance, home health aide for assistance with activities of daily living, RN for vital signs, medication management and wound monitoring posterior neck    This patient is homebound because: She is deconditioned easily fatigued following an C7-T1 fusion she is also given a brace and with high anxiety making her unsafe to leave home unassisted    The patient is, or has been, under my care and I have initiated the establishment of the plan of care. This patient will be followed by a physician who will periodically review the plan of care.    Schedule follow up visit with primary care provider within 7 days to reestablish care.    Electronically signed by: Purnima Parson CNP

## 2022-11-30 NOTE — LETTER
11/30/2022        RE: Mary Jo Vallejo   [address]  [facility] [id number]        [facility] GERIATRIC SERVICES  Chief Complaint   Patient presents with     Discharge Summary [facility] Medical Record Number:  [id number]  Place of Service where encounter took place:  [facility] ([facility]) [[id number]]  Code Status:  Unknown     HISTORY:      HPI:  Mary Jo Vallejo  is 67 year old (1955) undergoing physical and occupational therapy.  She is with past medical history hypothyroidism, hyperlipidemia, diabetes mellitus type 2, chronic atrial fibrillation, hypertension, chronic pain, reflux who underwent CERVICAL 7 - THORACIC 1 LAMINECTOMY AND FUSION, LEFT CERVICAL 5 - CERVICAL 6 LAMINOFORAMINOTOMY WITH STEALTH NAVIGATION (Left)    Today she is seen to review vital signs, labs, routine visit and a face-to-face for discharge.  She will discharge to home on 12/2/2022 with current medications and treatments.  She will have home care services PT OT home health aide RN.  She recently followed up with neurosurgery and was placed on Keflex twice daily until 12/2/2022.   Her incision is without redness or drainage noted.  she denied chest pain shortness of breath constipation or diarrhea.  She denied any numbness or tingling.  She reports her pain is controlled.  She is a type II diabetic does not check fingersticks and last A1c on 11/3/2022 was 6.2.  Other labs reviewed hemoglobin 11.5 ,BMP within normal limits, TSH on 10/17/2022 was 2.22.  She does have anxiety and on lorazepam.    ALLERGIES:Valium [diazepam], Erythromycin, Augmentin, Bee venom, Food, Penicillins, Simvastatin, and Adhesive tape    PAST MEDICAL HISTORY:   Past Medical History:   Diagnosis Date     Acute posthemorrhagic anemia      Body mass index 40.0-44.9, adult (H)      Chronic atrial fibrillation (H)      Diaphragmatic hernia without mention of obstruction or gangrene     Hiatal hernia     DM (diabetes mellitus) (H) 02/01/2011     hospitalized     DM (diabetes mellitus) (H)     oral control     Fibromyalgia      Hyperlipidemia LDL goal <100 03/11/2011    diet controlled     Hypertension      Hypothyroidism 03/11/2011     Intermittent asthma 01/27/2009    Related to bronchitis     Irregular heart beat      Irritable bowel syndrome     Irritable bowel     Other chronic pain      Primary localized osteoarthrosis, lower leg 10/27/2013    Hospitalized     Reflux esophagitis      Sleep apnea      Thyroglossal cyst     query     Unspecified hypothyroidism     Hypothyroidism       PAST SURGICAL HISTORY:   has a past surgical history that includes VAG HYST,RMV TUBE/OVARY; laparoscopic cholecystoenterostomy (1990's); APPENDECTOMY; NONSPECIFIC PROCEDURE (1/19/12); TOTAL KNEE ARTHROPLASTY (10/24/13); DRAIN/INJECT LARGE JOINT/BURSA (10/24/13); and Optical Tracking System Fusion Posterior Cervical Two Levels (Left, 11/11/2022).    FAMILY HISTORY: family history includes Breast Cancer in her maternal aunt and paternal grandmother; Breast Cancer (age of onset: 33) in her sister; Cancer in her maternal grandfather and paternal aunt; Cancer - colorectal (age of onset: 60) in her father; Depression in her mother; EYE* in her father; Hypertension in her mother; Neurologic Disorder in her father.    SOCIAL HISTORY:  reports that she has never smoked. She has never used smokeless tobacco. She reports that she does not currently use alcohol. She reports that she does not use drugs.    ROS:  Constitutional: Negative for activity change, appetite change, fatigue and fever.   HENT: Negative for congestion.    Respiratory: Negative for cough, shortness of breath and wheezing.    Cardiovascular: Negative for chest pain and leg swelling.   Gastrointestinal: Negative for abdominal distention, abdominal pain, constipation, diarrhea and nausea.   Genitourinary: Negative for dysuria.   Musculoskeletal: Negative for arthralgia. Negative for back pain.   Skin: Negative for  "color change and positive for surgical wound.   Neurological: Negative for dizziness.   Psychiatric/Behavioral: Negative for agitation, behavioral problems and confusion.  Positive for anxiety    Physical Exam:  Constitutional:       Appearance: Patient is well-developed.   HENT:      Head: Normocephalic.   Eyes:      Conjunctiva/sclera: Conjunctivae normal.   Neck:      Musculoskeletal: Normal range of motion.   Cardiovascular:      Rate and Rhythm: Normal rate and regular rhythm.      Heart sounds: Normal heart sounds. No murmur.   Pulmonary:      Effort: No respiratory distress.      Breath sounds: Normal breath sounds. No wheezing or rales.   Abdominal:      General: Bowel sounds are normal. There is no distension.      Palpations: Abdomen is soft.      Tenderness: There is no abdominal tenderness.   Musculoskeletal:       Normal range of motion.     Skin:General:        Skin is warm. Posterior neck  surgical wound clean dry and intact  Neurological:         Mental Status: Patient is alert and oriented to person, place, and time.   Psychiatric:         Behavior: Behavior normal.     Vitals:BP (!) 143/78   Pulse 57   Temp 97.5  F (36.4  C)   Resp 18   Ht 1.626 m (5' 4\")   Wt 110.2 kg (243 lb)   SpO2 96%   BMI 41.71 kg/m   and Body mass index is 41.71 kg/m .    Lab/Diagnostic data:   No results found for this or any previous visit (from the past 240 hour(s)).    MEDICATIONS:     Review of your medicines          Accurate as of November 30, 2022 11:59 AM. If you have any questions, ask your nurse or doctor.            CONTINUE these medicines which may have CHANGED, or have new prescriptions. If we are uncertain of the size of tablets/capsules you have at home, strength may be listed as something that might have changed.      Dose / Directions   oxyCODONE IR 10 MG tablet  Commonly known as: ROXICODONE  This may have changed: Another medication with the same name was removed. Continue taking this medication, " "and follow the directions you see here.  Changed by: Purnima Parson CNP      Dose: 10 mg  Take 10 mg by mouth every 4 hours as needed for severe pain (7-10)  Refills: 0        CONTINUE these medicines which have NOT CHANGED      Dose / Directions   acetaminophen 500 MG tablet  Commonly known as: TYLENOL      Dose: 1,000 mg  Take 1,000 mg by mouth 4 times daily And then 1000 mg every 6 hours as needed starting 12/9/22  Refills: 0     calcium carbonate 500 MG tablet  Commonly known as: OS-CAIO      Dose: 4 tablet  Take 4 tablets (2,000 mg) by mouth daily Patient has Calcium supplement, preferred brand \"Pure.\" These 300 mg pills are acceptable to the surgical team. Use 4 pills per dose as she also has calcium in her daily multivitamin.  Quantity: 1 tablet  Refills: 0     carboxymethylcellulose PF 1 % ophthalmic gel  Commonly known as: REFRESH LIQUIGEL      Dose: 1 drop  Place 1 drop into both eyes 4 times daily as needed  Refills: 0     cephALEXin 500 MG capsule  Commonly known as: KEFLEX      Dose: 500 mg  Take 500 mg by mouth 2 times daily  Refills: 0     cloNIDine 0.1 MG tablet  Commonly known as: CATAPRES      Dose: 0.1 mg  Take 0.1 mg by mouth daily as needed For sbp>180  Refills: 0     clotrimazole-betamethasone 1-0.05 % external cream  Commonly known as: LOTRISONE      Apply topically 2 times daily as needed  Refills: 0     hydrOXYzine 10 MG tablet  Commonly known as: ATARAX  Used for: Itching, Anxiety      Dose: 10 mg  Take 1 tablet (10 mg) by mouth every 4 hours as needed for anxiety or itching  Quantity: 30 tablet  Refills: 0     latanoprost 0.005 % ophthalmic solution  Commonly known as: XALATAN      Dose: 1 drop  Place 1 drop into both eyes At Bedtime  Refills: 0     levothyroxine 88 MCG tablet  Commonly known as: SYNTHROID/LEVOTHROID      Dose: 88 mcg  Take 88 mcg by mouth daily  Refills: 0     lisinopril 40 MG tablet  Commonly known as: ZESTRIL      Dose: 40 mg  Take 40 mg by mouth At Bedtime  Refills: 0   "   LORazepam 0.5 MG tablet  Commonly known as: ATIVAN      Dose: 0.5 mg  Take 0.5 mg by mouth every 6 hours as needed for anxiety  Refills: 0     metoprolol succinate ER 50 MG 24 hr tablet  Commonly known as: TOPROL XL      Dose: 50 mg  Take 50 mg by mouth At Bedtime  Refills: 0     multivitamin Tabs tablet      Dose: 1 tablet  Take 1 tablet by mouth daily  Refills: 0     omeprazole 20 MG tablet      Dose: 20 mg  Take 20 mg by mouth daily  Refills: 0     polyethylene glycol 17 GM/Dose powder  Commonly known as: MIRALAX      Dose: 17 g  Take 17 g by mouth daily Take while taking opioid medications and until bowels are back to normal routine. Hold for loose stools  Quantity: 510 g  Refills: 0     senna-docusate 8.6-50 MG tablet  Commonly known as: SENOKOT-S/PERICOLACE      Dose: 1 tablet  Take 1 tablet by mouth 2 times daily  Refills: 0            ASSESSMENT/PLAN  Encounter Diagnoses   Name Primary?     Cervical myelopathy (H) Yes     Pain management      Physical deconditioning      Anxiety      CERVICAL 7 - THORACIC 1 LAMINECTOMY AND FUSION, LEFT CERVICAL 5 - CERVICAL 6 LAMINOFORAMINOTOMY WITH STEALTH NAVIGATION (Left) follow-up with orthopedic spine, pain control, monitor for changes in CMS started on prophylactic Keflex twice daily until 12/2/2022 by neurosurgery.    Type 2 diabetes not on medications A1c on 11/3/2022 was 6.2    Pain management Tylenol 4 times daily until 12/8/2022 and then decrease to every 6 hours as needed, hydroxyzine 10 mg 4 times a day, oxycodone every 4 hours as needed    GERD on omeprazole    Anxiety as needed lorazepam    Hypertension continue clonidine, lisinopril, metoprolol succinate    Hypothyroidism on levothyroxine TSH on 10/17/2022 was 2.22    Physical deconditioning she will have home care services PT OT home health aide RN    DISCHARGE PLAN/FACE TO FACE:  I certify that services are/were furnished while this patient was under the care of a physician and that a physician or an  allowed non-physician practitioner (NPP), had a face-to-face encounter that meets the physician face-to-face encounter requirements. The encounter was in whole, or in part, related to the primary reason for home health. The patient is confined to his/her home and needs intermittent skilled nursing, physical therapy, speech-language pathology, or the continued need for occupational therapy. A plan of care has been established by a physician and is periodically reviewed by a physician.  Date of Face-to-Face Encounter: 11/30/2022    I certify that, based on my findings, the following services are medically necessary home health services: PT OT home health aide RN    My clinical findings support the need for the above skilled services because: PT OT for continued strength and endurance, home health aide for assistance with activities of daily living, RN for vital signs, medication management and wound monitoring posterior neck    This patient is homebound because: She is deconditioned easily fatigued following an C7-T1 fusion she is also given a brace and with high anxiety making her unsafe to leave home unassisted    The patient is, or has been, under my care and I have initiated the establishment of the plan of care. This patient will be followed by a physician who will periodically review the plan of care.    Schedule follow up visit with primary care provider within 7 days to reestablish care.    Electronically signed by: Purnima Parson CNP            Sincerely,        Purnima Parson CNP

## 2023-06-01 ENCOUNTER — HEALTH MAINTENANCE LETTER (OUTPATIENT)
Age: 68
End: 2023-06-01

## 2023-11-19 ENCOUNTER — HEALTH MAINTENANCE LETTER (OUTPATIENT)
Age: 68
End: 2023-11-19

## 2024-06-16 ENCOUNTER — HEALTH MAINTENANCE LETTER (OUTPATIENT)
Age: 69
End: 2024-06-16

## 2024-12-29 ENCOUNTER — HEALTH MAINTENANCE LETTER (OUTPATIENT)
Age: 69
End: 2024-12-29

## 2025-06-21 ENCOUNTER — HEALTH MAINTENANCE LETTER (OUTPATIENT)
Age: 70
End: 2025-06-21

## (undated) DEVICE — PROTECTOR ARM STANDARD ONE STEP

## (undated) DEVICE — Device

## (undated) DEVICE — ADH SKIN CLOSURE PREMIERPRO EXOFIN 1.0ML 3470

## (undated) DEVICE — RX SURGIFLO HEMOSTATIC MATRIX 8ML 2991

## (undated) DEVICE — TAPE ADH POROUS 3IN CURITY STD 7046C

## (undated) DEVICE — SYR 10ML LL W/O NDL 302995

## (undated) DEVICE — IOM STANDARD SUPPLY FEE

## (undated) DEVICE — GOWN LG DISP 9515

## (undated) DEVICE — DRSG DRAIN 2X2" 7087

## (undated) DEVICE — SUTURE VICRYL+ 1 18 CT/CR  VLT VCP753D

## (undated) DEVICE — DRAPE SHEET REV FOLD 3/4 9349

## (undated) DEVICE — SU STRATAFIX MONOCRYL 3-0 SPIRAL PS-2 30CM SXMP1B106

## (undated) DEVICE — CUSTOM PACK LUMBAR FUSION SNE5BLFHEA

## (undated) DEVICE — PREP CHLORAPREP W/ORANGE TINT 10.5ML 930715

## (undated) DEVICE — TOOL DISSECT MIDAS MR8 14CM BALL 2MM DIA MR8-14BA20

## (undated) DEVICE — SUTURE VICRYL+ 2-0 18 CT1/CR VLT VCP839D

## (undated) DEVICE — CATH IV ANGIO INTRO 14GA X 1 3/4" 381467

## (undated) DEVICE — GLOVE UNDER INDICATOR PI SZ 7.0 LF 41670

## (undated) DEVICE — KIT DEFOGGING-FOG INHIBITOR FOG1001

## (undated) DEVICE — MARKER SPHERES PASSIVE MEDT PACK 5 8801075

## (undated) DEVICE — ESU ELEC BLADE 2.75" COATED/INSULATED E1455

## (undated) DEVICE — CATH TRAY FOLEY SURESTEP 16FR DRAIN BAG STATOCK A899916

## (undated) DEVICE — TOOL DISSECT MIDAS MR8 14CM MATCH HEAD 3MM MR8-14MH30

## (undated) DEVICE — SOL WATER IRRIG 1000ML BOTTLE 2F7114

## (undated) DEVICE — SOL NACL 0.9% IRRIG 1000ML BOTTLE 2F7124

## (undated) DEVICE — PLATE GROUNDING ADULT W/CORD 9165L

## (undated) DEVICE — DRSG TEGADERM 4X4 3/4" 1626W

## (undated) DEVICE — GLOVE BIOGEL PI ULTRATOUCH G SZ 6.5 42165

## (undated) DEVICE — ESU PENCIL SMOKE EVAC W/ROCKER SWITCH 0703-047-000

## (undated) DEVICE — GLOVE BIOGEL PI SZ 7.5 40875

## (undated) DEVICE — PIN MAYFIELD SKULL DISP A1083

## (undated) DEVICE — GOWN IMPERVIOUS BREATHABLE SMART XLG 89045

## (undated) DEVICE — SUCTION MANIFOLD NEPTUNE 2 SYS 1 PORT 702-025-000

## (undated) DEVICE — IOM CASE FLAT FEE

## (undated) DEVICE — DRAPE C-ARM 60X42" 1013

## (undated) DEVICE — SUCTION TIP YANKAUER W/O VENT K86

## (undated) DEVICE — CELL SAVER

## (undated) DEVICE — DRSG PRIMAPORE 04X11 3/4"

## (undated) DEVICE — ESU BIPOLAR SEALER AQUAMANTYS 6MM 23-112-1

## (undated) DEVICE — PEDICLE SCREW BALL TIP PROBE, 2.3MM

## (undated) RX ORDER — PROPOFOL 10 MG/ML
INJECTION, EMULSION INTRAVENOUS
Status: DISPENSED
Start: 2022-11-11

## (undated) RX ORDER — LIDOCAINE HYDROCHLORIDE 10 MG/ML
INJECTION, SOLUTION EPIDURAL; INFILTRATION; INTRACAUDAL; PERINEURAL
Status: DISPENSED
Start: 2022-11-11

## (undated) RX ORDER — DEXMEDETOMIDINE HYDROCHLORIDE 4 UG/ML
INJECTION, SOLUTION INTRAVENOUS
Status: DISPENSED
Start: 2022-11-11

## (undated) RX ORDER — FENTANYL CITRATE 50 UG/ML
INJECTION, SOLUTION INTRAMUSCULAR; INTRAVENOUS
Status: DISPENSED
Start: 2022-11-11

## (undated) RX ORDER — DEXAMETHASONE SODIUM PHOSPHATE 10 MG/ML
INJECTION, EMULSION INTRAMUSCULAR; INTRAVENOUS
Status: DISPENSED
Start: 2022-11-11

## (undated) RX ORDER — ONDANSETRON 2 MG/ML
INJECTION INTRAMUSCULAR; INTRAVENOUS
Status: DISPENSED
Start: 2022-11-11